# Patient Record
Sex: FEMALE | Race: BLACK OR AFRICAN AMERICAN | NOT HISPANIC OR LATINO | Employment: STUDENT | URBAN - METROPOLITAN AREA
[De-identification: names, ages, dates, MRNs, and addresses within clinical notes are randomized per-mention and may not be internally consistent; named-entity substitution may affect disease eponyms.]

---

## 2019-03-08 ENCOUNTER — LAB VISIT (OUTPATIENT)
Dept: LAB | Facility: HOSPITAL | Age: 19
End: 2019-03-08
Attending: ADVANCED PRACTICE MIDWIFE
Payer: MEDICAID

## 2019-03-08 ENCOUNTER — INITIAL PRENATAL (OUTPATIENT)
Dept: OBSTETRICS AND GYNECOLOGY | Facility: CLINIC | Age: 19
End: 2019-03-08
Payer: MEDICAID

## 2019-03-08 ENCOUNTER — PROCEDURE VISIT (OUTPATIENT)
Dept: OBSTETRICS AND GYNECOLOGY | Facility: CLINIC | Age: 19
End: 2019-03-08
Payer: MEDICAID

## 2019-03-08 VITALS — DIASTOLIC BLOOD PRESSURE: 60 MMHG | SYSTOLIC BLOOD PRESSURE: 100 MMHG | WEIGHT: 177.69 LBS

## 2019-03-08 DIAGNOSIS — Z34.01 ENCOUNTER FOR SUPERVISION OF NORMAL FIRST PREGNANCY IN FIRST TRIMESTER: ICD-10-CM

## 2019-03-08 DIAGNOSIS — Z36.89 ENCOUNTER FOR FETAL ANATOMIC SURVEY: ICD-10-CM

## 2019-03-08 DIAGNOSIS — O26.841 UTERINE SIZE DATE DISCREPANCY, FIRST TRIMESTER: ICD-10-CM

## 2019-03-08 DIAGNOSIS — Z34.01 ENCOUNTER FOR SUPERVISION OF NORMAL FIRST PREGNANCY IN FIRST TRIMESTER: Primary | ICD-10-CM

## 2019-03-08 LAB
BASOPHILS # BLD AUTO: 0.02 K/UL
BASOPHILS NFR BLD: 0.3 %
DIFFERENTIAL METHOD: ABNORMAL
EOSINOPHIL # BLD AUTO: 0 K/UL
EOSINOPHIL NFR BLD: 0.5 %
ERYTHROCYTE [DISTWIDTH] IN BLOOD BY AUTOMATED COUNT: 13.2 %
HCT VFR BLD AUTO: 36 %
HGB BLD-MCNC: 11.9 G/DL
HGB S BLD QL SOLY: POSITIVE
IMM GRANULOCYTES # BLD AUTO: 0.02 K/UL
IMM GRANULOCYTES NFR BLD AUTO: 0.3 %
LYMPHOCYTES # BLD AUTO: 1.4 K/UL
LYMPHOCYTES NFR BLD: 23.4 %
MCH RBC QN AUTO: 30.8 PG
MCHC RBC AUTO-ENTMCNC: 33.1 G/DL
MCV RBC AUTO: 93 FL
MONOCYTES # BLD AUTO: 0.5 K/UL
MONOCYTES NFR BLD: 7.9 %
NEUTROPHILS # BLD AUTO: 3.9 K/UL
NEUTROPHILS NFR BLD: 67.6 %
NRBC BLD-RTO: 0 /100 WBC
PLATELET # BLD AUTO: 158 K/UL
PMV BLD AUTO: 13.3 FL
RBC # BLD AUTO: 3.86 M/UL
WBC # BLD AUTO: 5.8 K/UL

## 2019-03-08 PROCEDURE — 87491 CHLMYD TRACH DNA AMP PROBE: CPT

## 2019-03-08 PROCEDURE — 86703 HIV-1/HIV-2 1 RESULT ANTBDY: CPT

## 2019-03-08 PROCEDURE — 99999 PR PBB SHADOW E&M-NEW PATIENT-LVL II: ICD-10-PCS | Mod: PBBFAC,,, | Performed by: ADVANCED PRACTICE MIDWIFE

## 2019-03-08 PROCEDURE — 76805 OB US >/= 14 WKS SNGL FETUS: CPT | Mod: 26,S$PBB,, | Performed by: OBSTETRICS & GYNECOLOGY

## 2019-03-08 PROCEDURE — 76805 OB US >/= 14 WKS SNGL FETUS: CPT | Mod: PBBFAC | Performed by: OBSTETRICS & GYNECOLOGY

## 2019-03-08 PROCEDURE — 36415 COLL VENOUS BLD VENIPUNCTURE: CPT

## 2019-03-08 PROCEDURE — 99999 PR PBB SHADOW E&M-NEW PATIENT-LVL II: CPT | Mod: PBBFAC,,, | Performed by: ADVANCED PRACTICE MIDWIFE

## 2019-03-08 PROCEDURE — 81025 URINE PREGNANCY TEST: CPT | Mod: PBBFAC | Performed by: ADVANCED PRACTICE MIDWIFE

## 2019-03-08 PROCEDURE — 86850 RBC ANTIBODY SCREEN: CPT

## 2019-03-08 PROCEDURE — 99204 PR OFFICE/OUTPT VISIT, NEW, LEVL IV, 45-59 MIN: ICD-10-PCS | Mod: TH,S$PBB,, | Performed by: ADVANCED PRACTICE MIDWIFE

## 2019-03-08 PROCEDURE — 86592 SYPHILIS TEST NON-TREP QUAL: CPT

## 2019-03-08 PROCEDURE — 99204 OFFICE O/P NEW MOD 45 MIN: CPT | Mod: TH,S$PBB,, | Performed by: ADVANCED PRACTICE MIDWIFE

## 2019-03-08 PROCEDURE — 76805 PR US, OB 14+WKS, TRANSABD, SINGLE GESTATION: ICD-10-PCS | Mod: 26,S$PBB,, | Performed by: OBSTETRICS & GYNECOLOGY

## 2019-03-08 PROCEDURE — 85025 COMPLETE CBC W/AUTO DIFF WBC: CPT

## 2019-03-08 PROCEDURE — 85660 RBC SICKLE CELL TEST: CPT

## 2019-03-08 PROCEDURE — 99202 OFFICE O/P NEW SF 15 MIN: CPT | Mod: PBBFAC,TH | Performed by: ADVANCED PRACTICE MIDWIFE

## 2019-03-08 PROCEDURE — 86762 RUBELLA ANTIBODY: CPT

## 2019-03-08 PROCEDURE — 87340 HEPATITIS B SURFACE AG IA: CPT

## 2019-03-08 NOTE — PROGRESS NOTES
CHIEF COMPLAINT:   Patient presents with     initial OB      HISTORY OF PRESENT ILLNESS  Sisi Culver 18 y.o.  presents for initial OB.   The patient has no complaints today.  No nausea or vomiting. No bleeding or pain.  Pregnancy was not  planned but is desired.   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 Denies domestic abuse.  Denies chemical/pesticide/radiation exposure.    OB history:  LMP: Patient's last menstrual period was 11/15/2018.  EDC: Estimated Date of Delivery: 19  EGA: 18w1d       Health Maintenance   Topic Date Due    Lipid Panel  1998    CHLAMYDIA SCREENING  10/15/2013    HPV Vaccines (1 - Female 3-dose series) 10/15/2013    TETANUS VACCINE  10/15/2016    Influenza Vaccine  2018       History reviewed. No pertinent past medical history.    History reviewed. No pertinent surgical history.    History reviewed. No pertinent family history.    Social History     Socioeconomic History    Marital status: Single     Spouse name: None    Number of children: None    Years of education: None    Highest education level: None   Social Needs    Financial resource strain: None    Food insecurity - worry: None    Food insecurity - inability: None    Transportation needs - medical: None    Transportation needs - non-medical: None   Occupational History    None   Tobacco Use    Smoking status: Never Smoker   Substance and Sexual Activity    Alcohol use: No    Drug use: No    Sexual activity: Yes     Partners: Male     Birth control/protection: None   Other Topics Concern    None   Social History Narrative    None       Current Outpatient Medications   Medication Sig Dispense Refill    prenatal vit103-iron fum-folic () 27 mg iron- 1 mg Tab Take 1 tablet by mouth once daily. 28 tablet 6     No current facility-administered medications for this visit.        Review of patient's allergies indicates:  No Known  Allergies      PHYSICAL EXAM   Vitals:    03/08/19 1029   BP: 100/60        PAIN SCALE: 0/10 None    PHYSICAL EXAM    ROS:  GENERAL: No fever, chills, fatigability or weight loss.  CV: Denies chest pain  PULM: Denies shortness of breath or wheezing.  ABDOMEN: Not eating as much. No weight loss. Denies diarrhea, abdominal pain, hematemesis or blood in stool, some nausea.  URINARY: No flank pain, dysuria or hematuria.  REPRODUCTIVE: No abnormal vaginal bleeding.       PE:   APPEARANCE: Well nourished, well developed, in no acute distress  CHEST: Clear to auscultation bilaterally  CV: Regular rate and rhythm  BREASTS: Symmetrical, no skin changes or visible lesions. No palpable masses, nipple discharge or adenopathy bilaterally.  ABDOMEN: Soft. No tenderness or masses. No hepatosplenomegaly. No hernias  PELVIC:   VULVA: No lesions. Normal female genitalia.  URETHRAL MEATUS: Normal size and location, no lesions, no prolapse.  URETHRA: No masses, tenderness, prolapse or scarring.  VAGINA: Moist and well rugated, no discharge, no significant cystocele or rectocele.  CERVIX: No lesions, normal diameter, no stenosis, no cervical motion tenderness.   UTERUS: 16 wk size, regular shape, mobile, non-tender, normal position, good support.  ADNEXA: No masses, tenderness or CDS nodularity.  ANUS PERINEUM: No lesions, no relaxation, no external hemorrhoids.     UPT +    A/P:     -      Patient was counseled today on A.C.S. Pap guidelines and recommendations for yearly pelvic exams, mammograms and monthly self breast exams; to see her PCP for other health maintenance and pregnancy.   -      Patient's medications and medical history reviewed with patient and implications in pregnancy.   -      Pregnancy course discussed and 'AtoZ' book given. Patient was counseled on proper weight gain based on the [place] [employer]'s recommendations based on her pre-pregnancy weight. Discussed foods to avoid in pregnancy (i.e. sushi, fish that  are high in mercury, deli meat, and unpasteurized cheeses). Discussed prenatal vitamin options (i.e. stool softener, DHA). Discussed potential medical problems in pregnancy.  -     Discussed risk of Toxoplasmosis transmission from pets and reviewed risk reduction techniques.  -     Pt was counseled on exercise in pregnancy and weight gain recommendations.  -     Pt was counseled on travel recommendations and on risks of Zika virus exposure.  Current [facility] Zika advisories and prevention techniques were reviewed with pt.  Pt denies any recent international travel and does not plan travel during pregnancy.  Pt reports that partner does not plan travel either.  -     Oriented to practice including CNM collaboration.   -     Follow-up 4wk OB, labs and u/s today.

## 2019-03-09 LAB
ABO + RH BLD: NORMAL
BLD GP AB SCN CELLS X3 SERPL QL: NORMAL
C TRACH DNA SPEC QL NAA+PROBE: DETECTED
N GONORRHOEA DNA SPEC QL NAA+PROBE: NOT DETECTED
RPR SER QL: NORMAL

## 2019-03-11 ENCOUNTER — TELEPHONE (OUTPATIENT)
Dept: OBSTETRICS AND GYNECOLOGY | Facility: HOSPITAL | Age: 19
End: 2019-03-11

## 2019-03-11 DIAGNOSIS — A74.9 CHLAMYDIA INFECTION AFFECTING PREGNANCY IN SECOND TRIMESTER, ANTEPARTUM: ICD-10-CM

## 2019-03-11 DIAGNOSIS — O98.812 CHLAMYDIA INFECTION AFFECTING PREGNANCY IN SECOND TRIMESTER, ANTEPARTUM: ICD-10-CM

## 2019-03-11 LAB
HBV SURFACE AG SERPL QL IA: NEGATIVE
HIV 1+2 AB+HIV1 P24 AG SERPL QL IA: NEGATIVE
RUBV IGG SER-ACNC: 16.2 IU/ML
RUBV IGG SER-IMP: REACTIVE

## 2019-03-11 RX ORDER — AZITHROMYCIN 500 MG/1
1000 TABLET, FILM COATED ORAL ONCE
Qty: 2 TABLET | Refills: 0 | Status: SHIPPED | OUTPATIENT
Start: 2019-03-11 | End: 2019-03-11

## 2019-03-11 NOTE — TELEPHONE ENCOUNTER
Please call pt and inform her that she is positive for chlamydia, meds sent to pharmacy, partner needs treatment, no sex until both are treated and test negative. Macey

## 2019-03-13 ENCOUNTER — TELEPHONE (OUTPATIENT)
Dept: OBSTETRICS AND GYNECOLOGY | Facility: CLINIC | Age: 19
End: 2019-03-13

## 2019-03-13 NOTE — TELEPHONE ENCOUNTER
----- Message from Brett Castellano sent at 3/13/2019 10:28 AM CDT -----  Contact: self [phone]  .Type:  Patient Returning Call    Who Called:Sisi Culver  Who Left Message for Patient:Macey  Does the patient know what this is regarding?:no  Would the patient rather a call back or a response via [email or web address]? Call back  Best Call Back Number:[phone]  Additional Information:

## 2019-03-13 NOTE — TELEPHONE ENCOUNTER
Spoke with pt. Notified of +chlam. Notified of rx. Advised to take the rx today and have partner/s treated, not just tested. Advised to avoid intercourse until PURNIMA. Pt verbalized understanding.

## 2019-03-18 ENCOUNTER — TELEPHONE (OUTPATIENT)
Dept: OBSTETRICS AND GYNECOLOGY | Facility: CLINIC | Age: 19
End: 2019-03-18

## 2019-03-18 NOTE — TELEPHONE ENCOUNTER
----- Message from Jessie Bell sent at 3/18/2019 11:46 AM CDT -----  ..Type:  Patient Returning Call    Who Called:pt   Who Left Message for Patient: unknown   Does the patient know what this is regarding?: unknown   Would the patient rather a call back or a response via [facility]? Call back   Best Call Back Number: [phone]  Additional Information: Pt is returning a call to nurse.

## 2019-04-05 ENCOUNTER — PROCEDURE VISIT (OUTPATIENT)
Dept: OBSTETRICS AND GYNECOLOGY | Facility: CLINIC | Age: 19
End: 2019-04-05
Payer: MEDICAID

## 2019-04-05 ENCOUNTER — ROUTINE PRENATAL (OUTPATIENT)
Dept: OBSTETRICS AND GYNECOLOGY | Facility: CLINIC | Age: 19
End: 2019-04-05
Payer: MEDICAID

## 2019-04-05 VITALS — SYSTOLIC BLOOD PRESSURE: 98 MMHG | WEIGHT: 178.81 LBS | DIASTOLIC BLOOD PRESSURE: 60 MMHG

## 2019-04-05 DIAGNOSIS — Z34.01 ENCOUNTER FOR SUPERVISION OF NORMAL FIRST PREGNANCY IN FIRST TRIMESTER: Primary | ICD-10-CM

## 2019-04-05 DIAGNOSIS — O26.841 UTERINE SIZE DATE DISCREPANCY, FIRST TRIMESTER: ICD-10-CM

## 2019-04-05 PROCEDURE — 76816 PR  US,PREGNANT UTERUS,F/U,TRANSABD APP: ICD-10-PCS | Mod: 26,S$PBB,, | Performed by: OBSTETRICS & GYNECOLOGY

## 2019-04-05 PROCEDURE — 76816 OB US FOLLOW-UP PER FETUS: CPT | Mod: PBBFAC | Performed by: OBSTETRICS & GYNECOLOGY

## 2019-04-05 PROCEDURE — 99213 OFFICE O/P EST LOW 20 MIN: CPT | Mod: TH,S$PBB,, | Performed by: ADVANCED PRACTICE MIDWIFE

## 2019-04-05 PROCEDURE — 99999 PR PBB SHADOW E&M-EST. PATIENT-LVL II: CPT | Mod: PBBFAC,,, | Performed by: ADVANCED PRACTICE MIDWIFE

## 2019-04-05 PROCEDURE — 99213 PR OFFICE/OUTPT VISIT, EST, LEVL III, 20-29 MIN: ICD-10-PCS | Mod: TH,S$PBB,, | Performed by: ADVANCED PRACTICE MIDWIFE

## 2019-04-05 PROCEDURE — 99999 PR PBB SHADOW E&M-EST. PATIENT-LVL II: ICD-10-PCS | Mod: PBBFAC,,, | Performed by: ADVANCED PRACTICE MIDWIFE

## 2019-04-05 PROCEDURE — 99212 OFFICE O/P EST SF 10 MIN: CPT | Mod: PBBFAC,TH | Performed by: ADVANCED PRACTICE MIDWIFE

## 2019-04-05 PROCEDURE — 76816 OB US FOLLOW-UP PER FETUS: CPT | Mod: 26,S$PBB,, | Performed by: OBSTETRICS & GYNECOLOGY

## 2019-04-05 NOTE — PROGRESS NOTES
US today anatomy complete, growth normal, PURNIMA next OV, labs after 28 wks since late to care, orders placed. Discussed round ligament pain and comfort measures. Good FM. Worrying about ANDRES and returning to school. Discussed staying active to help with labor onset prior to term vs post term. al

## 2019-05-14 ENCOUNTER — ROUTINE PRENATAL (OUTPATIENT)
Dept: OBSTETRICS AND GYNECOLOGY | Facility: CLINIC | Age: 19
End: 2019-05-14
Payer: MEDICAID

## 2019-05-14 VITALS — DIASTOLIC BLOOD PRESSURE: 72 MMHG | WEIGHT: 185.44 LBS | SYSTOLIC BLOOD PRESSURE: 116 MMHG

## 2019-05-14 DIAGNOSIS — Z34.01 ENCOUNTER FOR SUPERVISION OF NORMAL FIRST PREGNANCY IN FIRST TRIMESTER: Primary | ICD-10-CM

## 2019-05-14 PROCEDURE — 99212 OFFICE O/P EST SF 10 MIN: CPT | Mod: PBBFAC,TH | Performed by: ADVANCED PRACTICE MIDWIFE

## 2019-05-14 PROCEDURE — 99999 PR PBB SHADOW E&M-EST. PATIENT-LVL II: CPT | Mod: PBBFAC,,, | Performed by: ADVANCED PRACTICE MIDWIFE

## 2019-05-14 PROCEDURE — 87491 CHLMYD TRACH DNA AMP PROBE: CPT

## 2019-05-14 PROCEDURE — 99999 PR PBB SHADOW E&M-EST. PATIENT-LVL II: ICD-10-PCS | Mod: PBBFAC,,, | Performed by: ADVANCED PRACTICE MIDWIFE

## 2019-05-14 PROCEDURE — 99213 OFFICE O/P EST LOW 20 MIN: CPT | Mod: TH,S$PBB,, | Performed by: ADVANCED PRACTICE MIDWIFE

## 2019-05-14 PROCEDURE — 99213 PR OFFICE/OUTPT VISIT, EST, LEVL III, 20-29 MIN: ICD-10-PCS | Mod: TH,S$PBB,, | Performed by: ADVANCED PRACTICE MIDWIFE

## 2019-05-14 RX ORDER — ONDANSETRON 8 MG/1
TABLET, ORALLY DISINTEGRATING ORAL
Refills: 1 | COMMUNITY
Start: 2019-04-25 | End: 2020-05-21

## 2019-05-14 NOTE — PROGRESS NOTES
PURNIMA for chl sent today. Doing well, some tightening in her abdomen, discussed BH vs PTL s/s. Increase po water, no doing workouts at this time. tdap handout provided and reviewed. Labs next OV had a sundae today. Discussed LND plans-epid BF/FF, girl. tee brochure provided, reviewed how IUDs work, SE, DUB.     Patient viewed [employer] video, Nourish and was provided with [facility] handouts: A Good Latch and Tips for a More Comfortable Labor. Discussed nonpharmacological pain relief methods for labor, techniques and benefits of effective breastfeeding position and latch, and basic breastfeeding management. Encouraged patient to attend [facility]s Prenatal Breastfeeding Class and to download the [employer] mobile delphine if she has not already done so. Patient verbalizes understanding. al

## 2019-05-15 LAB
C TRACH DNA SPEC QL NAA+PROBE: NOT DETECTED
N GONORRHOEA DNA SPEC QL NAA+PROBE: NOT DETECTED

## 2019-05-30 ENCOUNTER — LAB VISIT (OUTPATIENT)
Dept: LAB | Facility: HOSPITAL | Age: 19
End: 2019-05-30
Payer: MEDICAID

## 2019-05-30 ENCOUNTER — ROUTINE PRENATAL (OUTPATIENT)
Dept: OBSTETRICS AND GYNECOLOGY | Facility: CLINIC | Age: 19
End: 2019-05-30
Payer: MEDICAID

## 2019-05-30 VITALS — WEIGHT: 186.31 LBS | DIASTOLIC BLOOD PRESSURE: 70 MMHG | SYSTOLIC BLOOD PRESSURE: 110 MMHG

## 2019-05-30 DIAGNOSIS — Z23 NEED FOR TDAP VACCINATION: ICD-10-CM

## 2019-05-30 DIAGNOSIS — Z34.01 ENCOUNTER FOR SUPERVISION OF NORMAL FIRST PREGNANCY IN FIRST TRIMESTER: Primary | ICD-10-CM

## 2019-05-30 DIAGNOSIS — Z34.01 ENCOUNTER FOR SUPERVISION OF NORMAL FIRST PREGNANCY IN FIRST TRIMESTER: ICD-10-CM

## 2019-05-30 LAB
BASOPHILS # BLD AUTO: 0.03 K/UL (ref 0–0.2)
BASOPHILS NFR BLD: 0.4 % (ref 0–1.9)
DIFFERENTIAL METHOD: ABNORMAL
EOSINOPHIL # BLD AUTO: 0.1 K/UL (ref 0–0.5)
EOSINOPHIL NFR BLD: 0.6 % (ref 0–8)
ERYTHROCYTE [DISTWIDTH] IN BLOOD BY AUTOMATED COUNT: 13.5 % (ref 11.5–14.5)
GLUCOSE SERPL-MCNC: 119 MG/DL (ref 70–140)
HCT VFR BLD AUTO: 36.4 % (ref 37–48.5)
HGB BLD-MCNC: 12.2 G/DL (ref 12–16)
IMM GRANULOCYTES # BLD AUTO: 0.07 K/UL (ref 0–0.04)
IMM GRANULOCYTES NFR BLD AUTO: 0.9 % (ref 0–0.5)
LYMPHOCYTES # BLD AUTO: 1.3 K/UL (ref 1–4.8)
LYMPHOCYTES NFR BLD: 16.6 % (ref 18–48)
MCH RBC QN AUTO: 31.3 PG (ref 27–31)
MCHC RBC AUTO-ENTMCNC: 33.5 G/DL (ref 32–36)
MCV RBC AUTO: 93 FL (ref 82–98)
MONOCYTES # BLD AUTO: 0.5 K/UL (ref 0.3–1)
MONOCYTES NFR BLD: 6.3 % (ref 4–15)
NEUTROPHILS # BLD AUTO: 6 K/UL (ref 1.8–7.7)
NEUTROPHILS NFR BLD: 75.2 % (ref 38–73)
NRBC BLD-RTO: 0 /100 WBC
PLATELET # BLD AUTO: 161 K/UL (ref 150–350)
PMV BLD AUTO: 12.4 FL (ref 9.2–12.9)
RBC # BLD AUTO: 3.9 M/UL (ref 4–5.4)
WBC # BLD AUTO: 7.99 K/UL (ref 3.9–12.7)

## 2019-05-30 PROCEDURE — 99213 PR OFFICE/OUTPT VISIT, EST, LEVL III, 20-29 MIN: ICD-10-PCS | Mod: TH,S$PBB,, | Performed by: ADVANCED PRACTICE MIDWIFE

## 2019-05-30 PROCEDURE — 86703 HIV-1/HIV-2 1 RESULT ANTBDY: CPT

## 2019-05-30 PROCEDURE — 82950 GLUCOSE TEST: CPT

## 2019-05-30 PROCEDURE — 99999 PR PBB SHADOW E&M-EST. PATIENT-LVL II: ICD-10-PCS | Mod: PBBFAC,,, | Performed by: ADVANCED PRACTICE MIDWIFE

## 2019-05-30 PROCEDURE — 85025 COMPLETE CBC W/AUTO DIFF WBC: CPT

## 2019-05-30 PROCEDURE — 99212 OFFICE O/P EST SF 10 MIN: CPT | Mod: PBBFAC,TH | Performed by: ADVANCED PRACTICE MIDWIFE

## 2019-05-30 PROCEDURE — 36415 COLL VENOUS BLD VENIPUNCTURE: CPT

## 2019-05-30 PROCEDURE — 86592 SYPHILIS TEST NON-TREP QUAL: CPT

## 2019-05-30 PROCEDURE — 99999 PR PBB SHADOW E&M-EST. PATIENT-LVL II: CPT | Mod: PBBFAC,,, | Performed by: ADVANCED PRACTICE MIDWIFE

## 2019-05-30 PROCEDURE — 90471 IMMUNIZATION ADMIN: CPT | Mod: PBBFAC

## 2019-05-30 PROCEDURE — 99213 OFFICE O/P EST LOW 20 MIN: CPT | Mod: TH,S$PBB,, | Performed by: ADVANCED PRACTICE MIDWIFE

## 2019-05-31 LAB
HIV 1+2 AB+HIV1 P24 AG SERPL QL IA: NEGATIVE
RPR SER QL: NORMAL

## 2019-06-05 NOTE — PROGRESS NOTES
Doing labs today, good FM, no PTL s/s. tdap today. Planning to breastfeed. Exp growth scan 34 wks.     Patient viewed [employer] video, Nourish and was provided with [facility] handouts: A Good Latch and Tips for a More Comfortable Labor. Discussed nonpharmacological pain relief methods for labor, techniques and benefits of effective breastfeeding position and latch, and basic breastfeeding management. Encouraged patient to attend [facility]s Prenatal Breastfeeding Class and to download the [email or web address] mobile delphine if she has not already done so. Patient verbalizes understanding.  al

## 2019-06-12 ENCOUNTER — ROUTINE PRENATAL (OUTPATIENT)
Dept: OBSTETRICS AND GYNECOLOGY | Facility: CLINIC | Age: 19
End: 2019-06-12
Payer: MEDICAID

## 2019-06-12 VITALS — DIASTOLIC BLOOD PRESSURE: 68 MMHG | WEIGHT: 187.19 LBS | SYSTOLIC BLOOD PRESSURE: 114 MMHG

## 2019-06-12 DIAGNOSIS — Z3A.31 31 WEEKS GESTATION OF PREGNANCY: ICD-10-CM

## 2019-06-12 DIAGNOSIS — Z34.03 ENCOUNTER FOR SUPERVISION OF NORMAL FIRST PREGNANCY IN THIRD TRIMESTER: Primary | ICD-10-CM

## 2019-06-12 DIAGNOSIS — O26.843 UTERINE SIZE-DATE DISCREPANCY IN THIRD TRIMESTER: ICD-10-CM

## 2019-06-12 PROCEDURE — 99212 OFFICE O/P EST SF 10 MIN: CPT | Mod: TH,S$PBB,, | Performed by: MIDWIFE

## 2019-06-12 PROCEDURE — 99212 PR OFFICE/OUTPT VISIT, EST, LEVL II, 10-19 MIN: ICD-10-PCS | Mod: TH,S$PBB,, | Performed by: MIDWIFE

## 2019-06-12 PROCEDURE — 99212 OFFICE O/P EST SF 10 MIN: CPT | Mod: PBBFAC,TH | Performed by: MIDWIFE

## 2019-06-12 PROCEDURE — 99999 PR PBB SHADOW E&M-EST. PATIENT-LVL II: ICD-10-PCS | Mod: PBBFAC,,, | Performed by: MIDWIFE

## 2019-06-12 PROCEDURE — 99999 PR PBB SHADOW E&M-EST. PATIENT-LVL II: CPT | Mod: PBBFAC,,, | Performed by: MIDWIFE

## 2019-06-12 NOTE — PROGRESS NOTES
18 y.o. female  at 31w6d   Reports + FM, denies VB, LOF or CTX  Doing well without concerns   TW lbs   Reviewed 28wk lab results (O POS)  US at [facility] for growth  Reviewed warning signs, normal FKCs,  labor precautions and how/when to call.  RTC x 2 wks, call or present sooner prn.

## 2019-06-26 ENCOUNTER — ROUTINE PRENATAL (OUTPATIENT)
Dept: OBSTETRICS AND GYNECOLOGY | Facility: CLINIC | Age: 19
End: 2019-06-26
Payer: MEDICAID

## 2019-06-26 ENCOUNTER — PROCEDURE VISIT (OUTPATIENT)
Dept: OBSTETRICS AND GYNECOLOGY | Facility: CLINIC | Age: 19
End: 2019-06-26
Payer: MEDICAID

## 2019-06-26 VITALS — WEIGHT: 190.5 LBS | DIASTOLIC BLOOD PRESSURE: 68 MMHG | SYSTOLIC BLOOD PRESSURE: 104 MMHG

## 2019-06-26 DIAGNOSIS — Z34.03 ENCOUNTER FOR SUPERVISION OF NORMAL FIRST PREGNANCY IN THIRD TRIMESTER: Primary | ICD-10-CM

## 2019-06-26 DIAGNOSIS — O26.843 UTERINE SIZE-DATE DISCREPANCY IN THIRD TRIMESTER: ICD-10-CM

## 2019-06-26 PROCEDURE — 76816 PR  US,PREGNANT UTERUS,F/U,TRANSABD APP: ICD-10-PCS | Mod: 26,S$PBB,, | Performed by: OBSTETRICS & GYNECOLOGY

## 2019-06-26 PROCEDURE — 99999 PR PBB SHADOW E&M-EST. PATIENT-LVL II: ICD-10-PCS | Mod: PBBFAC,,, | Performed by: ADVANCED PRACTICE MIDWIFE

## 2019-06-26 PROCEDURE — 99212 OFFICE O/P EST SF 10 MIN: CPT | Mod: PBBFAC,TH,25 | Performed by: ADVANCED PRACTICE MIDWIFE

## 2019-06-26 PROCEDURE — 76816 OB US FOLLOW-UP PER FETUS: CPT | Mod: PBBFAC | Performed by: OBSTETRICS & GYNECOLOGY

## 2019-06-26 PROCEDURE — 76816 OB US FOLLOW-UP PER FETUS: CPT | Mod: 26,S$PBB,, | Performed by: OBSTETRICS & GYNECOLOGY

## 2019-06-26 PROCEDURE — 99213 OFFICE O/P EST LOW 20 MIN: CPT | Mod: TH,S$PBB,, | Performed by: ADVANCED PRACTICE MIDWIFE

## 2019-06-26 PROCEDURE — 99999 PR PBB SHADOW E&M-EST. PATIENT-LVL II: CPT | Mod: PBBFAC,,, | Performed by: ADVANCED PRACTICE MIDWIFE

## 2019-06-26 PROCEDURE — 99213 PR OFFICE/OUTPT VISIT, EST, LEVL III, 20-29 MIN: ICD-10-PCS | Mod: TH,S$PBB,, | Performed by: ADVANCED PRACTICE MIDWIFE

## 2019-06-27 NOTE — PROGRESS NOTES
Reports good FM.  US reviewed, VTX, EFW 27%, MVP 5.0, BPP 8 of 8   States is going to BF.  Encouraged to download coffective delphine.  Benefits of skin to skin an BF reviewed.  Normal FKC's, S/S PTL and when to go to the hospital reviewed.  RTC 2 weeks with GBS

## 2019-07-10 ENCOUNTER — ROUTINE PRENATAL (OUTPATIENT)
Dept: OBSTETRICS AND GYNECOLOGY | Facility: CLINIC | Age: 19
End: 2019-07-10
Payer: MEDICAID

## 2019-07-10 VITALS — SYSTOLIC BLOOD PRESSURE: 118 MMHG | DIASTOLIC BLOOD PRESSURE: 74 MMHG | WEIGHT: 194 LBS

## 2019-07-10 DIAGNOSIS — Z34.03 ENCOUNTER FOR SUPERVISION OF NORMAL FIRST PREGNANCY IN THIRD TRIMESTER: Primary | ICD-10-CM

## 2019-07-10 PROCEDURE — 99213 OFFICE O/P EST LOW 20 MIN: CPT | Mod: TH,S$PBB,, | Performed by: ADVANCED PRACTICE MIDWIFE

## 2019-07-10 PROCEDURE — 87081 CULTURE SCREEN ONLY: CPT

## 2019-07-10 PROCEDURE — 99999 PR PBB SHADOW E&M-EST. PATIENT-LVL II: CPT | Mod: PBBFAC,,, | Performed by: ADVANCED PRACTICE MIDWIFE

## 2019-07-10 PROCEDURE — 99213 PR OFFICE/OUTPT VISIT, EST, LEVL III, 20-29 MIN: ICD-10-PCS | Mod: TH,S$PBB,, | Performed by: ADVANCED PRACTICE MIDWIFE

## 2019-07-10 PROCEDURE — 99999 PR PBB SHADOW E&M-EST. PATIENT-LVL II: ICD-10-PCS | Mod: PBBFAC,,, | Performed by: ADVANCED PRACTICE MIDWIFE

## 2019-07-10 PROCEDURE — 99212 OFFICE O/P EST SF 10 MIN: CPT | Mod: PBBFAC,TH | Performed by: ADVANCED PRACTICE MIDWIFE

## 2019-07-10 NOTE — PROGRESS NOTES
Doing well, no complaints  [employer]ivani [employer] brochures given to pt, will order at [facility]   GBS collected and handout given  VE per pt request  Labor precautions and fetal movement reviewed, when to go to hospital   Stressed hydration and exercise  Discussed breastfeeding and ordering breastpump  The skin of the suprapubic region was evaluated and appears clean, dry and intact.  Counseled the patient to shower daily and to wash this area with an antibacterial soap such as Dial daily.  Advised her to not shave the hair from this area from now until after delivery.  I also counseled the patient to place antibacterial hand soap in all her bathrooms and kitchen at home to help facilitate proper hand hygiene practices before and after delivery.

## 2019-07-13 LAB — BACTERIA SPEC AEROBE CULT: NORMAL

## 2019-07-17 ENCOUNTER — ROUTINE PRENATAL (OUTPATIENT)
Dept: OBSTETRICS AND GYNECOLOGY | Facility: CLINIC | Age: 19
End: 2019-07-17
Payer: MEDICAID

## 2019-07-17 VITALS — WEIGHT: 198.63 LBS | SYSTOLIC BLOOD PRESSURE: 118 MMHG | DIASTOLIC BLOOD PRESSURE: 68 MMHG

## 2019-07-17 DIAGNOSIS — Z34.03 ENCOUNTER FOR SUPERVISION OF NORMAL FIRST PREGNANCY IN THIRD TRIMESTER: Primary | ICD-10-CM

## 2019-07-17 PROCEDURE — 99213 OFFICE O/P EST LOW 20 MIN: CPT | Mod: TH,S$PBB,, | Performed by: ADVANCED PRACTICE MIDWIFE

## 2019-07-17 PROCEDURE — 99213 PR OFFICE/OUTPT VISIT, EST, LEVL III, 20-29 MIN: ICD-10-PCS | Mod: TH,S$PBB,, | Performed by: ADVANCED PRACTICE MIDWIFE

## 2019-07-17 PROCEDURE — 99999 PR PBB SHADOW E&M-EST. PATIENT-LVL II: ICD-10-PCS | Mod: PBBFAC,,, | Performed by: ADVANCED PRACTICE MIDWIFE

## 2019-07-17 PROCEDURE — 99212 OFFICE O/P EST SF 10 MIN: CPT | Mod: PBBFAC,TH | Performed by: ADVANCED PRACTICE MIDWIFE

## 2019-07-17 PROCEDURE — 99999 PR PBB SHADOW E&M-EST. PATIENT-LVL II: CPT | Mod: PBBFAC,,, | Performed by: ADVANCED PRACTICE MIDWIFE

## 2019-07-21 NOTE — PROGRESS NOTES
Reports good FM.  Advised GBS neg.  Mirena ordered.  Normal FKC's, S/S labor and when to go to hospital.

## 2019-07-24 ENCOUNTER — ROUTINE PRENATAL (OUTPATIENT)
Dept: OBSTETRICS AND GYNECOLOGY | Facility: CLINIC | Age: 19
End: 2019-07-24
Payer: MEDICAID

## 2019-07-24 VITALS — WEIGHT: 202.38 LBS | DIASTOLIC BLOOD PRESSURE: 84 MMHG | SYSTOLIC BLOOD PRESSURE: 122 MMHG

## 2019-07-24 DIAGNOSIS — Z36.89 ENCOUNTER FOR ULTRASOUND TO CHECK FETAL GROWTH: ICD-10-CM

## 2019-07-24 DIAGNOSIS — Z3A.37 37 WEEKS GESTATION OF PREGNANCY: ICD-10-CM

## 2019-07-24 DIAGNOSIS — Z34.03 ENCOUNTER FOR SUPERVISION OF NORMAL FIRST PREGNANCY IN THIRD TRIMESTER: Primary | ICD-10-CM

## 2019-07-24 DIAGNOSIS — O26.843 UTERINE SIZE DATE DISCREPANCY PREGNANCY, THIRD TRIMESTER: ICD-10-CM

## 2019-07-24 PROCEDURE — 99212 OFFICE O/P EST SF 10 MIN: CPT | Mod: TH,S$PBB,, | Performed by: MIDWIFE

## 2019-07-24 PROCEDURE — 99999 PR PBB SHADOW E&M-EST. PATIENT-LVL II: ICD-10-PCS | Mod: PBBFAC,,, | Performed by: MIDWIFE

## 2019-07-24 PROCEDURE — 99999 PR PBB SHADOW E&M-EST. PATIENT-LVL II: CPT | Mod: PBBFAC,,, | Performed by: MIDWIFE

## 2019-07-24 PROCEDURE — 99212 OFFICE O/P EST SF 10 MIN: CPT | Mod: PBBFAC,TH | Performed by: MIDWIFE

## 2019-07-24 PROCEDURE — 99212 PR OFFICE/OUTPT VISIT, EST, LEVL II, 10-19 MIN: ICD-10-PCS | Mod: TH,S$PBB,, | Performed by: MIDWIFE

## 2019-07-24 NOTE — PROGRESS NOTES
18 y.o. female  at 37w6d   Reports + FM, denies VB, LOF or regular CTX  Doing well without concerns   TW lbs   U/S for growth at next appt  Delivery consents already signed  Reviewed warning signs, normal FKCs, labor precautions and how/when to call.  RTC x 1 wks, call or present sooner prn.     ELEANOR Sanchez CNM

## 2019-07-31 ENCOUNTER — PROCEDURE VISIT (OUTPATIENT)
Dept: OBSTETRICS AND GYNECOLOGY | Facility: CLINIC | Age: 19
End: 2019-07-31
Payer: MEDICAID

## 2019-07-31 ENCOUNTER — ROUTINE PRENATAL (OUTPATIENT)
Dept: OBSTETRICS AND GYNECOLOGY | Facility: CLINIC | Age: 19
End: 2019-07-31
Payer: MEDICAID

## 2019-07-31 VITALS — DIASTOLIC BLOOD PRESSURE: 70 MMHG | SYSTOLIC BLOOD PRESSURE: 102 MMHG | WEIGHT: 200 LBS

## 2019-07-31 DIAGNOSIS — Z36.89 ENCOUNTER FOR ULTRASOUND TO CHECK FETAL GROWTH: ICD-10-CM

## 2019-07-31 DIAGNOSIS — Z34.03 ENCOUNTER FOR SUPERVISION OF NORMAL FIRST PREGNANCY IN THIRD TRIMESTER: Primary | ICD-10-CM

## 2019-07-31 PROCEDURE — 99999 PR PBB SHADOW E&M-EST. PATIENT-LVL II: CPT | Mod: PBBFAC,,, | Performed by: ADVANCED PRACTICE MIDWIFE

## 2019-07-31 PROCEDURE — 76815 OB US LIMITED FETUS(S): CPT | Mod: PBBFAC | Performed by: OBSTETRICS & GYNECOLOGY

## 2019-07-31 PROCEDURE — 99213 OFFICE O/P EST LOW 20 MIN: CPT | Mod: TH,S$PBB,, | Performed by: ADVANCED PRACTICE MIDWIFE

## 2019-07-31 PROCEDURE — 99999 PR PBB SHADOW E&M-EST. PATIENT-LVL II: ICD-10-PCS | Mod: PBBFAC,,, | Performed by: ADVANCED PRACTICE MIDWIFE

## 2019-07-31 PROCEDURE — 99212 OFFICE O/P EST SF 10 MIN: CPT | Mod: PBBFAC,TH,25 | Performed by: ADVANCED PRACTICE MIDWIFE

## 2019-07-31 PROCEDURE — 76819 FETAL BIOPHYS PROFIL W/O NST: CPT | Mod: PBBFAC | Performed by: OBSTETRICS & GYNECOLOGY

## 2019-07-31 PROCEDURE — 76819 FETAL BIOPHYS PROFIL W/O NST: CPT | Mod: 26,S$PBB,, | Performed by: OBSTETRICS & GYNECOLOGY

## 2019-07-31 PROCEDURE — 76815 PR  US,PREGNANT UTERUS,LIMITED, 1/> FETUSES: ICD-10-PCS | Mod: 26,S$PBB,, | Performed by: OBSTETRICS & GYNECOLOGY

## 2019-07-31 PROCEDURE — 99213 PR OFFICE/OUTPT VISIT, EST, LEVL III, 20-29 MIN: ICD-10-PCS | Mod: TH,S$PBB,, | Performed by: ADVANCED PRACTICE MIDWIFE

## 2019-07-31 PROCEDURE — 76815 OB US LIMITED FETUS(S): CPT | Mod: 26,S$PBB,, | Performed by: OBSTETRICS & GYNECOLOGY

## 2019-07-31 PROCEDURE — 76819 PR US, OB, FETAL BIOPHYSICAL, W/O NST: ICD-10-PCS | Mod: 26,S$PBB,, | Performed by: OBSTETRICS & GYNECOLOGY

## 2019-07-31 NOTE — PROGRESS NOTES
Doing well, reports some vaginal pressure  Labor precautions and fetal movement reviewed, when to go to hospital   Stressed hydration   VE per pt request  Discussed IOL at 41 weeks if undelivered, pt verbalizes understanding  GBS negative, pt aware  PNV refilled  Ultrasound today with cephalic presentation, anterior placenta, 3VC, MVP 5.3cm, YUMIKO 11.6cm, EFW 37%, 7lb 2oz, BPP 8/8, reviewed with pt and her mother

## 2019-08-07 ENCOUNTER — ROUTINE PRENATAL (OUTPATIENT)
Dept: OBSTETRICS AND GYNECOLOGY | Facility: CLINIC | Age: 19
End: 2019-08-07
Payer: MEDICAID

## 2019-08-07 VITALS — SYSTOLIC BLOOD PRESSURE: 116 MMHG | WEIGHT: 201.06 LBS | DIASTOLIC BLOOD PRESSURE: 78 MMHG

## 2019-08-07 DIAGNOSIS — O48.0 POST-TERM PREGNANCY, 40-42 WEEKS OF GESTATION: ICD-10-CM

## 2019-08-07 DIAGNOSIS — Z34.03 ENCOUNTER FOR SUPERVISION OF NORMAL FIRST PREGNANCY IN THIRD TRIMESTER: Primary | ICD-10-CM

## 2019-08-07 PROCEDURE — 99212 OFFICE O/P EST SF 10 MIN: CPT | Mod: PBBFAC,TH | Performed by: ADVANCED PRACTICE MIDWIFE

## 2019-08-07 PROCEDURE — 99213 PR OFFICE/OUTPT VISIT, EST, LEVL III, 20-29 MIN: ICD-10-PCS | Mod: TH,S$PBB,, | Performed by: ADVANCED PRACTICE MIDWIFE

## 2019-08-07 PROCEDURE — 99999 PR PBB SHADOW E&M-EST. PATIENT-LVL II: CPT | Mod: PBBFAC,,, | Performed by: ADVANCED PRACTICE MIDWIFE

## 2019-08-07 PROCEDURE — 99213 OFFICE O/P EST LOW 20 MIN: CPT | Mod: TH,S$PBB,, | Performed by: ADVANCED PRACTICE MIDWIFE

## 2019-08-07 PROCEDURE — 99999 PR PBB SHADOW E&M-EST. PATIENT-LVL II: ICD-10-PCS | Mod: PBBFAC,,, | Performed by: ADVANCED PRACTICE MIDWIFE

## 2019-08-07 RX ORDER — MISOPROSTOL 100 MCG
25 TABLET ORAL EVERY 4 HOURS
Status: CANCELLED | OUTPATIENT
Start: 2019-08-07 | End: 2019-08-08

## 2019-08-07 RX ORDER — MISOPROSTOL 100 UG/1
600 TABLET ORAL
Status: CANCELLED | OUTPATIENT
Start: 2019-08-07

## 2019-08-07 RX ORDER — SODIUM CHLORIDE 9 MG/ML
INJECTION, SOLUTION INTRAVENOUS
Status: CANCELLED | OUTPATIENT
Start: 2019-08-07

## 2019-08-07 RX ORDER — ONDANSETRON 4 MG/1
8 TABLET, ORALLY DISINTEGRATING ORAL EVERY 8 HOURS PRN
Status: CANCELLED | OUTPATIENT
Start: 2019-08-07

## 2019-08-07 RX ORDER — TERBUTALINE SULFATE 1 MG/ML
0.25 INJECTION SUBCUTANEOUS
Status: CANCELLED | OUTPATIENT
Start: 2019-08-07

## 2019-08-07 RX ORDER — SODIUM CHLORIDE, SODIUM LACTATE, POTASSIUM CHLORIDE, CALCIUM CHLORIDE 600; 310; 30; 20 MG/100ML; MG/100ML; MG/100ML; MG/100ML
INJECTION, SOLUTION INTRAVENOUS CONTINUOUS
Status: CANCELLED | OUTPATIENT
Start: 2019-08-07

## 2019-08-07 NOTE — PROGRESS NOTES
Doing well today with no complaints. READY for baby.   Request membrane sweep. Risks/benefits discussed. Consent obtained.   Cervix soft.   Reviewed FKC/ROM/Labor precautions.   Discussed risks/benefits of IOL. Increased risk for c/s. Patient aware and wishes to proceed next week with IOL. Set for 08/12 @ 0001. Orders placed.

## 2019-08-08 ENCOUNTER — ANESTHESIA (OUTPATIENT)
Dept: OBSTETRICS AND GYNECOLOGY | Facility: HOSPITAL | Age: 19
End: 2019-08-08

## 2019-08-08 ENCOUNTER — ANESTHESIA EVENT (OUTPATIENT)
Dept: OBSTETRICS AND GYNECOLOGY | Facility: HOSPITAL | Age: 19
End: 2019-08-08

## 2019-08-08 ENCOUNTER — HOSPITAL ENCOUNTER (INPATIENT)
Facility: HOSPITAL | Age: 19
LOS: 2 days | Discharge: HOME | End: 2019-08-10
Attending: OBSTETRICS & GYNECOLOGY | Admitting: OBSTETRICS & GYNECOLOGY
Payer: MEDICAID

## 2019-08-08 DIAGNOSIS — Z34.03 ENCOUNTER FOR SUPERVISION OF NORMAL FIRST PREGNANCY IN THIRD TRIMESTER: ICD-10-CM

## 2019-08-08 DIAGNOSIS — O48.0 POST-TERM PREGNANCY, 40-42 WEEKS OF GESTATION: ICD-10-CM

## 2019-08-08 DIAGNOSIS — O47.9 THREATENED LABOR AT TERM: ICD-10-CM

## 2019-08-08 DIAGNOSIS — O42.90 AMNIOTIC FLUID LEAKING: ICD-10-CM

## 2019-08-08 LAB
ABO + RH BLD: NORMAL
BASOPHILS # BLD AUTO: 0.01 K/UL (ref 0–0.2)
BASOPHILS NFR BLD: 0.1 % (ref 0–1.9)
BLD GP AB SCN CELLS X3 SERPL QL: NORMAL
DIFFERENTIAL METHOD: ABNORMAL
EOSINOPHIL # BLD AUTO: 0 K/UL (ref 0–0.5)
EOSINOPHIL NFR BLD: 0.1 % (ref 0–8)
ERYTHROCYTE [DISTWIDTH] IN BLOOD BY AUTOMATED COUNT: 12.3 % (ref 11.5–14.5)
HCT VFR BLD AUTO: 35.8 % (ref 37–48.5)
HGB BLD-MCNC: 12.4 G/DL (ref 12–16)
LYMPHOCYTES # BLD AUTO: 1.4 K/UL (ref 1–4.8)
LYMPHOCYTES NFR BLD: 12.5 % (ref 18–48)
MCH RBC QN AUTO: 31 PG (ref 27–31)
MCHC RBC AUTO-ENTMCNC: 34.6 G/DL (ref 32–36)
MCV RBC AUTO: 90 FL (ref 82–98)
MONOCYTES # BLD AUTO: 0.7 K/UL (ref 0.3–1)
MONOCYTES NFR BLD: 6.3 % (ref 4–15)
NEUTROPHILS # BLD AUTO: 8.8 K/UL (ref 1.8–7.7)
NEUTROPHILS NFR BLD: 81 % (ref 38–73)
PLATELET # BLD AUTO: 164 K/UL (ref 150–350)
PMV BLD AUTO: 12.3 FL (ref 9.2–12.9)
RBC # BLD AUTO: 4 M/UL (ref 4–5.4)
WBC # BLD AUTO: 10.89 K/UL (ref 3.9–12.7)

## 2019-08-08 PROCEDURE — 72100002 HC LABOR CARE, 1ST 8 HOURS

## 2019-08-08 PROCEDURE — 85025 COMPLETE CBC W/AUTO DIFF WBC: CPT

## 2019-08-08 PROCEDURE — 11000001 HC ACUTE MED/SURG PRIVATE ROOM

## 2019-08-08 PROCEDURE — 59025 FETAL NON-STRESS TEST: CPT

## 2019-08-08 PROCEDURE — 72200004 HC VAGINAL DELIVERY LEVEL I

## 2019-08-08 PROCEDURE — 99211 OFF/OP EST MAY X REQ PHY/QHP: CPT | Mod: 25,TH

## 2019-08-08 PROCEDURE — 59409 OBSTETRICAL CARE: CPT | Mod: GB,,, | Performed by: MIDWIFE

## 2019-08-08 PROCEDURE — 25000003 PHARM REV CODE 250: Performed by: MIDWIFE

## 2019-08-08 PROCEDURE — 59409 PR OBSTETRICAL CARE,VAG DELIV ONLY: ICD-10-PCS | Mod: GB,,, | Performed by: MIDWIFE

## 2019-08-08 PROCEDURE — 36415 COLL VENOUS BLD VENIPUNCTURE: CPT

## 2019-08-08 PROCEDURE — 86850 RBC ANTIBODY SCREEN: CPT

## 2019-08-08 RX ORDER — OXYTOCIN/RINGER'S LACTATE 20/1000 ML
41.7 PLASTIC BAG, INJECTION (ML) INTRAVENOUS CONTINUOUS
Status: DISCONTINUED | OUTPATIENT
Start: 2019-08-08 | End: 2019-08-08

## 2019-08-08 RX ORDER — ONDANSETRON 8 MG/1
8 TABLET, ORALLY DISINTEGRATING ORAL EVERY 8 HOURS PRN
Status: DISCONTINUED | OUTPATIENT
Start: 2019-08-08 | End: 2019-08-10 | Stop reason: HOSPADM

## 2019-08-08 RX ORDER — MISOPROSTOL 200 UG/1
600 TABLET ORAL
Status: DISCONTINUED | OUTPATIENT
Start: 2019-08-08 | End: 2019-08-10 | Stop reason: HOSPADM

## 2019-08-08 RX ORDER — ONDANSETRON 8 MG/1
8 TABLET, ORALLY DISINTEGRATING ORAL EVERY 8 HOURS PRN
Status: DISCONTINUED | OUTPATIENT
Start: 2019-08-08 | End: 2019-08-08

## 2019-08-08 RX ORDER — OXYTOCIN/RINGER'S LACTATE 20/1000 ML
41.65 PLASTIC BAG, INJECTION (ML) INTRAVENOUS CONTINUOUS
Status: DISPENSED | OUTPATIENT
Start: 2019-08-08 | End: 2019-08-09

## 2019-08-08 RX ORDER — DIPHENHYDRAMINE HYDROCHLORIDE 50 MG/ML
25 INJECTION INTRAMUSCULAR; INTRAVENOUS EVERY 4 HOURS PRN
Status: DISCONTINUED | OUTPATIENT
Start: 2019-08-08 | End: 2019-08-10 | Stop reason: HOSPADM

## 2019-08-08 RX ORDER — SODIUM CHLORIDE, SODIUM LACTATE, POTASSIUM CHLORIDE, CALCIUM CHLORIDE 600; 310; 30; 20 MG/100ML; MG/100ML; MG/100ML; MG/100ML
INJECTION, SOLUTION INTRAVENOUS CONTINUOUS
Status: DISCONTINUED | OUTPATIENT
Start: 2019-08-08 | End: 2019-08-08

## 2019-08-08 RX ORDER — IBUPROFEN 600 MG/1
600 TABLET ORAL EVERY 6 HOURS
Status: DISCONTINUED | OUTPATIENT
Start: 2019-08-08 | End: 2019-08-10 | Stop reason: HOSPADM

## 2019-08-08 RX ORDER — OXYTOCIN/RINGER'S LACTATE 20/1000 ML
2 PLASTIC BAG, INJECTION (ML) INTRAVENOUS CONTINUOUS
Status: DISCONTINUED | OUTPATIENT
Start: 2019-08-08 | End: 2019-08-08

## 2019-08-08 RX ORDER — ACETAMINOPHEN 500 MG
500 TABLET ORAL EVERY 6 HOURS PRN
Status: DISCONTINUED | OUTPATIENT
Start: 2019-08-08 | End: 2019-08-08

## 2019-08-08 RX ORDER — DIPHENHYDRAMINE HCL 25 MG
25 CAPSULE ORAL EVERY 4 HOURS PRN
Status: DISCONTINUED | OUTPATIENT
Start: 2019-08-08 | End: 2019-08-10 | Stop reason: HOSPADM

## 2019-08-08 RX ORDER — TERBUTALINE SULFATE 1 MG/ML
0.25 INJECTION SUBCUTANEOUS
Status: DISCONTINUED | OUTPATIENT
Start: 2019-08-08 | End: 2019-08-08

## 2019-08-08 RX ORDER — ACETAMINOPHEN 325 MG/1
650 TABLET ORAL EVERY 6 HOURS PRN
Status: DISCONTINUED | OUTPATIENT
Start: 2019-08-08 | End: 2019-08-10 | Stop reason: HOSPADM

## 2019-08-08 RX ORDER — OXYTOCIN/RINGER'S LACTATE 20/1000 ML
333 PLASTIC BAG, INJECTION (ML) INTRAVENOUS CONTINUOUS
Status: DISCONTINUED | OUTPATIENT
Start: 2019-08-08 | End: 2019-08-08

## 2019-08-08 RX ORDER — HYDROCODONE BITARTRATE AND ACETAMINOPHEN 5; 325 MG/1; MG/1
1 TABLET ORAL EVERY 4 HOURS PRN
Status: DISCONTINUED | OUTPATIENT
Start: 2019-08-08 | End: 2019-08-10 | Stop reason: HOSPADM

## 2019-08-08 RX ORDER — SODIUM CHLORIDE 9 MG/ML
INJECTION, SOLUTION INTRAVENOUS
Status: DISCONTINUED | OUTPATIENT
Start: 2019-08-08 | End: 2019-08-08

## 2019-08-08 RX ORDER — DOCUSATE SODIUM 100 MG/1
200 CAPSULE, LIQUID FILLED ORAL 2 TIMES DAILY PRN
Status: DISCONTINUED | OUTPATIENT
Start: 2019-08-08 | End: 2019-08-10 | Stop reason: HOSPADM

## 2019-08-08 RX ORDER — ONDANSETRON 8 MG/1
8 TABLET, ORALLY DISINTEGRATING ORAL EVERY 8 HOURS PRN
Status: DISCONTINUED | OUTPATIENT
Start: 2019-08-08 | End: 2019-08-08 | Stop reason: SDUPTHER

## 2019-08-08 RX ORDER — MISOPROSTOL 100 MCG
25 TABLET ORAL EVERY 4 HOURS
Status: DISCONTINUED | OUTPATIENT
Start: 2019-08-08 | End: 2019-08-08

## 2019-08-08 RX ADMIN — IBUPROFEN 600 MG: 600 TABLET ORAL at 06:08

## 2019-08-08 NOTE — ASSESSMENT & PLAN NOTE
, 40 wks labor  Admit  4/70%  SROM clear fluid  GBS neg  Continuous monitoring  Pitocin Augmentation  Epidural upon request

## 2019-08-08 NOTE — L&D DELIVERY NOTE
[facility] -   Vaginal Delivery   Operative Note    SUMMARY     Normal spontaneous vaginal delivery of live infant, was placed on mothers abdomen for skin to skin and bulb suctioning performed.  Infant delivered position VANDANA over intact perineum.  Nuchal cord: Yes, cord reduced following delivery.    Spontaneous delivery of placenta and IV pitocin given noting good uterine tone.  No lacerations noted.  Patient tolerated delivery well. Sponge needle and lap counted correctly x2.    Indications: Amniotic fluid leaking  Pregnancy complicated by:   Patient Active Problem List   Diagnosis    Chlamydia infection affecting pregnancy in second trimester, antepartum    Vaginal delivery    Single live birth     Admitting GA: 40w0d    Delivery Information for  Lidia Culver    Birth information:  YOB: 2021   Time of birth: 4:59 PM   Sex: female   Head Delivery Date/Time: 8/8/2019  4:59 PM   Delivery type: Vaginal, Spontaneous   Gestational Age: 40w0d    Delivery Providers    Delivering clinician:  Lisa Sanchez CNM   Provider Role    Dom Ritter CNM Midwife    Byron Basurto RN Registered Nurse    Heather Flores RN Registered Nurse    Linda Navarro, ST Surgical Tech            Measurements    Weight:    Length:           Apgars    Living status:  Living  Apgars:   1 min.:   5 min.:   10 min.:   15 min.:   20 min.:     Skin color:   1  1       Heart rate:   2  2       Reflex irritability:   2  2       Muscle tone:   2  2       Respiratory effort:   2  2       Total:   9  9       Apgars assigned by:  CHELITA FLORES RN         Operative Delivery    Forceps attempted?:  No  Vacuum extractor attempted?:  No         Shoulder Dystocia    Shoulder dystocia present?:  No           Presentation    Presentation:  Vertex  Position:  Middle Occiput Anterior           Interventions/Resuscitation    Method:  Bulb Suctioning, Tactile Stimulation       Cord    Vessels:  3 vessels  Complications:  None,  Nuchal  Nuchal Intervention:  reduced  Nuchal Cord Description:  loose nuchal cord  Number of Loops:  1  Delayed Cord Clamping?:  Yes  Cord Clamped Date/Time:  2019  5:01 PM  Cord Blood Disposition:  Lab  Gases Sent?:  No  Stem Cell Collection (by MD):  No       Placenta    Placenta delivery date/time:  2019 1705  Placenta removal:  Spontaneous  Placenta appearance:  Intact  Placenta disposition:  discarded           Labor Events:       labor: No     Labor Onset Date/Time:         Dilation Complete Date/Time:         Start Pushing Date/Time:       Rupture Date/Time:              Rupture type:           Fluid Amount:        Fluid Color:        Fluid Odor:        Membrane Status (PeriCalm): SRM (Spontaneous Rupture)      Rupture Date/Time (PeriCalm): 2019 14:50:00      Fluid Amount (PeriCalm): Small      Fluid Color (PeriCalm): Clear       steroids: None     Antibiotics given for GBS: No     Induction: none     Indications for induction:        Augmentation:       Indications for augmentation:       Labor complications: None     Additional complications:          Cervical ripening:                     Delivery:      Episiotomy: None     Indication for Episiotomy:       Perineal Lacerations: None Repaired:      Periurethral Laceration:   Repaired:     Labial Laceration:   Repaired:     Sulcus Laceration:   Repaired:     Vaginal Laceration:   Repaired:     Cervical Laceration:   Repaired:     Repair suture: None     Repair # of packets:       Last Value - EBL - Nursing (mL): 50     Sum - EBL - Nursing (mL): 50     Last Value - EBL - Anesthesia (mL):      Calculated QBL (mL):        Vaginal Sweep Performed: No     Surgicount Correct:         Other providers:       Anesthesia    Method:  None          Details (if applicable):  Trial of Labor      Categorization:      Priority:     Indications for :     Incision Type:       Additional  information:  Forceps:     Vacuum:    Breech:    Observed anomalies    Other (Comments):

## 2019-08-08 NOTE — SUBJECTIVE & OBJECTIVE
Obstetric HPI:  Patient reports  contractions, active fetal movement, No vaginal bleeding , Yes loss of fluid     This pregnancy has been complicated by chlamydia    OB History    Para Term  AB Living   1 0 0 0 0 0   SAB TAB Ectopic Multiple Live Births   0 0 0 0 0      # Outcome Date GA Lbr Danny/2nd Weight Sex Delivery Anes PTL Lv   1 Current              History reviewed. No pertinent past medical history.  History reviewed. No pertinent surgical history.    Facility-Administered Medications Prior to Admission   Medication    levonorgestrel 20 mcg/24 hours (5 yrs) 52 mg IUD 1 Intra Uterine Device     PTA Medications   Medication Sig    ondansetron (ZOFRAN-ODT) 8 MG TbDL DIS ONE T PO Q 8 H    prenatal vit103-iron fum-folic () 27 mg iron- 1 mg Tab Take 1 tablet by mouth once daily.       Review of patient's allergies indicates:  No Known Allergies     Family History     None        Tobacco Use    Smoking status: Never Smoker    Smokeless tobacco: Never Used   Substance and Sexual Activity    Alcohol use: No    Drug use: No    Sexual activity: Yes     Partners: Male     Birth control/protection: None     Review of Systems   Constitutional: Negative for activity change and appetite change.   HENT: Negative for tinnitus.    Eyes: Negative for visual disturbance.   Respiratory: Negative for cough.    Cardiovascular: Negative for chest pain.   Gastrointestinal: Positive for abdominal pain. Negative for nausea and vomiting.   Genitourinary: Positive for pelvic pain and vaginal discharge. Negative for vaginal bleeding.   Musculoskeletal: Negative for back pain.   Neurological: Negative for headaches.      Objective:     Vital Signs (Most Recent):  Temp: 98.3 °F (36.8 °C) (19 1134)  Pulse: 76 (19 1500)  Resp: 19 (19 1500)  BP: 121/85 (19 1334)  SpO2: 97 % (19 1500) Vital Signs (24h Range):  Temp:  [98.3 °F (36.8 °C)] 98.3 °F (36.8 °C)  Pulse:  [70-96] 76  Resp:   [18-20] 19  SpO2:  [96 %-99 %] 97 %  BP: (109-130)/(71-85) 121/85     Weight: 91.2 kg (201 lb)  Body mass index is 28.84 kg/m².    FHT: 135Cat 1 (reassuring)  TOCO:  Q 5-6 minutes    Physical Exam:   Constitutional: She appears well-developed and well-nourished.    HENT:   Head: Normocephalic and atraumatic.     Neck: Normal range of motion. Neck supple.    Cardiovascular: Normal rate and normal heart sounds.     Pulmonary/Chest: Effort normal and breath sounds normal.        Abdominal: Soft.   gravid     Genitourinary: Vaginal discharge (positive nitrazene) found.           Musculoskeletal: Normal range of motion and moves all extremeties.       Neurological: She is alert. She has normal reflexes.    Skin: Skin is warm and dry.    Psychiatric: She has a normal mood and affect. Her behavior is normal. Judgment and thought content normal.       Cervix:  Dilation:  4  Effacement:  70  Station: -2  Presentation: Vertex     Significant Labs:  Lab Results   Component Value Date    GROUPTRH O POS 03/08/2019    HEPBSAG Negative 03/08/2019    STREPBCULT No Group B Streptococcus isolated 07/10/2019       I have personallly reviewed all pertinent lab results from the last 24 hours.

## 2019-08-08 NOTE — H&P
[facility] -   Obstetrics  History & Physical    Patient Name: Sisi Culver  MRN: [id number]  Admission Date: 2019  Primary Care Provider: Primary Doctor No    Subjective:     Principal Problem:Threatened labor at term    History of Present Illness:  19 yo , 40 weeks presents in labor      Obstetric HPI:  Patient reports  contractions, active fetal movement, No vaginal bleeding , Yes loss of fluid     This pregnancy has been complicated by chlamydia    OB History    Para Term  AB Living   1 0 0 0 0 0   SAB TAB Ectopic Multiple Live Births   0 0 0 0 0      # Outcome Date GA Lbr Danny/2nd Weight Sex Delivery Anes PTL Lv   1 Current              History reviewed. No pertinent past medical history.  History reviewed. No pertinent surgical history.    Facility-Administered Medications Prior to Admission   Medication    levonorgestrel 20 mcg/24 hours (5 yrs) 52 mg IUD 1 Intra Uterine Device     PTA Medications   Medication Sig    ondansetron (ZOFRAN-ODT) 8 MG TbDL DIS ONE T PO Q 8 H    prenatal vit103-iron fum-folic () 27 mg iron- 1 mg Tab Take 1 tablet by mouth once daily.       Review of patient's allergies indicates:  No Known Allergies     Family History     None        Tobacco Use    Smoking status: Never Smoker    Smokeless tobacco: Never Used   Substance and Sexual Activity    Alcohol use: No    Drug use: No    Sexual activity: Yes     Partners: Male     Birth control/protection: None     Review of Systems   Constitutional: Negative for activity change and appetite change.   HENT: Negative for tinnitus.    Eyes: Negative for visual disturbance.   Respiratory: Negative for cough.    Cardiovascular: Negative for chest pain.   Gastrointestinal: Positive for abdominal pain. Negative for nausea and vomiting.   Genitourinary: Positive for pelvic pain and vaginal discharge. Negative for vaginal bleeding.   Musculoskeletal: Negative for back pain.   Neurological: Negative  for headaches.      Objective:     Vital Signs (Most Recent):  Temp: 98.3 °F (36.8 °C) (19 1134)  Pulse: 76 (19 1500)  Resp: 19 (19 1500)  BP: 121/85 (19 1334)  SpO2: 97 % (19 1500) Vital Signs (24h Range):  Temp:  [98.3 °F (36.8 °C)] 98.3 °F (36.8 °C)  Pulse:  [70-96] 76  Resp:  [18-20] 19  SpO2:  [96 %-99 %] 97 %  BP: (109-130)/(71-85) 121/85     Weight: 91.2 kg (201 lb)  Body mass index is 28.84 kg/m².    FHT: 135Cat 1 (reassuring)  TOCO:  Q 5-6 minutes    Physical Exam:   Constitutional: She appears well-developed and well-nourished.    HENT:   Head: Normocephalic and atraumatic.     Neck: Normal range of motion. Neck supple.    Cardiovascular: Normal rate and normal heart sounds.     Pulmonary/Chest: Effort normal and breath sounds normal.        Abdominal: Soft.   gravid     Genitourinary: Vaginal discharge (positive nitrazene) found.           Musculoskeletal: Normal range of motion and moves all extremeties.       Neurological: She is alert. She has normal reflexes.    Skin: Skin is warm and dry.    Psychiatric: She has a normal mood and affect. Her behavior is normal. Judgment and thought content normal.       Cervix:  Dilation:  4  Effacement:  70  Station: -2  Presentation: Vertex     Significant Labs:  Lab Results   Component Value Date    GROUPTRH O POS 2019    HEPBSAG Negative 2019    STREPBCULT No Group B Streptococcus isolated 07/10/2019       I have personallly reviewed all pertinent lab results from the last 24 hours.    Assessment/Plan:     18 y.o. female  at 40w0d for:    * Threatened labor at term  , 40 wks labor  Admit  4/70%  SROM clear fluid  GBS neg  Continuous monitoring  Pitocin Augmentation  Epidural upon request        Lisa Sanchez CNM  Obstetrics  [facility] -

## 2019-08-08 NOTE — ANESTHESIA PREPROCEDURE EVALUATION
08/08/2019  Sisi Culver is a 18 y.o., female.    Anesthesia Evaluation    I have reviewed the Patient Summary Reports.    I have reviewed the Nursing Notes.   I have reviewed the Medications.     Review of Systems  Anesthesia Hx:  No problems with previous Anesthesia Denies Hx of Anesthetic complications  Neg history of prior surgery. Denies Family Hx of Anesthesia complications.   Denies Personal Hx of Anesthesia complications.   Social:  Non-Smoker, No Alcohol Use    Hematology/Oncology:  Hematology Normal   Oncology Normal     EENT/Dental:EENT/Dental Normal   Cardiovascular:  Cardiovascular Normal Exercise tolerance: good  NYHA Classification I ECG has been reviewed.    Pulmonary:  Pulmonary Normal    Renal/:  Renal/ Normal     Hepatic/GI:  Hepatic/GI Normal    Musculoskeletal:  Musculoskeletal Normal    Neurological:  Neurology Normal    Endocrine:  Endocrine Normal    Dermatological:  Skin Normal    Psych:  Psychiatric Normal           Physical Exam  General:  Well nourished    Airway/Jaw/Neck:  Airway Findings: Mouth Opening: Normal Tongue: Normal  General Airway Assessment: Adult  Mallampati: II  TM Distance: 4 - 6 cm  Jaw/Neck Findings:  Neck ROM: Normal ROM      Dental:  Dental Findings: In tact   Chest/Lungs:  Chest/Lungs Findings: Clear to auscultation, Normal Respiratory Rate     Heart/Vascular:  Heart Findings: Rate: Normal  Rhythm: Regular Rhythm  Sounds: Normal        Mental Status:  Mental Status Findings:  Cooperative, Alert and Oriented         Anesthesia Plan  Type of Anesthesia, risks & benefits discussed:  Anesthesia Type:  epidural, spinal  Patient's Preference:   Intra-op Monitoring Plan:   Intra-op Monitoring Plan Comments:   Post Op Pain Control Plan: multimodal analgesia  Post Op Pain Control Plan Comments:   Induction:   IV  Beta Blocker:         Informed Consent: Patient  understands risks and agrees with Anesthesia plan.  Questions answered. Anesthesia consent signed with patient.  ASA Score: 2     Day of Surgery Review of History & Physical: I have interviewed and examined the patient. I have reviewed the patient's H&P dated:  There are no significant changes.  H&P update referred to the surgeon.  H&P completed by Anesthesiologist.       Ready For Surgery From Anesthesia Perspective.

## 2019-08-08 NOTE — HOSPITAL COURSE
, 40 wks labor  Admit  4/70%  SROM clear fluid  GBS neg  Continuous monitoring  Pitocin Augmentation  Epidural upon request  19  @ 1652  19 PPD # 1 doing well  8/10/19-PPD2, d/c home

## 2019-08-09 PROCEDURE — 25000003 PHARM REV CODE 250: Performed by: MIDWIFE

## 2019-08-09 PROCEDURE — 11000001 HC ACUTE MED/SURG PRIVATE ROOM

## 2019-08-09 PROCEDURE — 99231 SBSQ HOSP IP/OBS SF/LOW 25: CPT | Mod: ,,, | Performed by: ADVANCED PRACTICE MIDWIFE

## 2019-08-09 PROCEDURE — 99231 PR SUBSEQUENT HOSPITAL CARE,LEVL I: ICD-10-PCS | Mod: ,,, | Performed by: ADVANCED PRACTICE MIDWIFE

## 2019-08-09 RX ADMIN — IBUPROFEN 600 MG: 600 TABLET ORAL at 05:08

## 2019-08-09 RX ADMIN — IBUPROFEN 600 MG: 600 TABLET ORAL at 12:08

## 2019-08-09 RX ADMIN — HYDROCODONE BITARTRATE AND ACETAMINOPHEN 1 TABLET: 5; 325 TABLET ORAL at 05:08

## 2019-08-09 RX ADMIN — HYDROCODONE BITARTRATE AND ACETAMINOPHEN 1 TABLET: 5; 325 TABLET ORAL at 12:08

## 2019-08-09 RX ADMIN — IBUPROFEN 600 MG: 600 TABLET ORAL at 11:08

## 2019-08-09 RX ADMIN — HYDROCODONE BITARTRATE AND ACETAMINOPHEN 1 TABLET: 5; 325 TABLET ORAL at 01:08

## 2019-08-09 NOTE — SUBJECTIVE & OBJECTIVE
Hospital course: , 40 wks labor  Admit  4/70%  SROM clear fluid  GBS neg  Continuous monitoring  Pitocin Augmentation  Epidural upon request  19  @ 1659  19 PPD # 1 doing well    Interval History:     She is doing well this morning. She is tolerating a regular diet without nausea or vomiting. She is voiding spontaneously. She is ambulating. She has passed flatus, and has not a BM. Vaginal bleeding is mild. She denies fever or chills. Abdominal pain is mild and controlled with oral medications. She is breastfeeding. She desires circumcision for her male baby: not applicable.    Objective:     Vital Signs (Most Recent):  Temp: 97.8 °F (36.6 °C) (19 0000)  Pulse: 82 (19 0000)  Resp: 18 (19 0000)  BP: 130/72 (19 0000)  SpO2: 100 % (19 1634) Vital Signs (24h Range):  Temp:  [97.8 °F (36.6 °C)-99 °F (37.2 °C)] 97.8 °F (36.6 °C)  Pulse:  [67-97] 82  Resp:  [18-20] 18  SpO2:  [96 %-100 %] 100 %  BP: (109-140)/(68-85) 130/72     Weight: 91.2 kg (201 lb)  Body mass index is 28.84 kg/m².      Intake/Output Summary (Last 24 hours) at 2019 0810  Last data filed at 2019 0000  Gross per 24 hour   Intake --   Output 1850 ml   Net -1850 ml       Significant Labs:  Lab Results   Component Value Date    GROUPTRH O POS 2019    HEPBSAG Negative 2019    STREPBCULT No Group B Streptococcus isolated 07/10/2019     Recent Labs   Lab 19  1533   HGB 12.4   HCT 35.8*       I have personallly reviewed all pertinent lab results from the last 24 hours.    Physical Exam:   Constitutional: She is oriented to person, place, and time. She appears well-developed and well-nourished.        Pulmonary/Chest: Effort normal.        Abdominal: Soft.     Genitourinary: Uterus normal.           Musculoskeletal: Normal range of motion and moves all extremeties.       Neurological: She is alert and oriented to person, place, and time.    Skin: Skin is warm and dry.    Psychiatric: She has  a normal mood and affect. Her behavior is normal.

## 2019-08-09 NOTE — LACTATION NOTE
Lactation Rounds:     Visited mother at bedside. She stated that infant latched well for first feeding. Mother plans to breastfeed and bottle feed formula, but she is undecided about when she plans to introduce formula.  Discussed management of feeding plan and options, including pumping. Encouraged mother to continue to think about her feeding goals and to ask questions as needed. Admit teaching done, including feeding on demand, recognition of feeding cues, expectations for infant feeding/behavior in first day of life, importance of skin to skin contact, hand expression. Encouraged mother to call for feeding assistance or with any questions or concerns. She verbalized understanding.

## 2019-08-09 NOTE — LACTATION NOTE
This note was copied from a baby's chart.  Lactation called to room:   Assisted mother into cross cradle hold to the left breast. Nipple inverted. Infant able to latch. No audible swallows noted. Infant changed to right breast in cross cradle hold. Infant able to obtain an adequate latch. No audible swallows noted. Mother also formula feeding via bottle. Mother to call for assistance as needed.      08/09/19 1238   Maternal Infant Feeding   Maternal Emotional State assist needed   Infant Positioning clutch/football   Nipple Shape After Feeding, Left wnl   Nipple Shape After Feeding, Right wnl

## 2019-08-09 NOTE — PROGRESS NOTES
[facility] -   Obstetrics  Postpartum Progress Note    Patient Name: Sisi Culver  MRN: [id number]  Admission Date: 2019  Hospital Length of Stay: 1 days  Attending Physician: Yanet Bowman MD  Primary Care Provider: Primary Doctor No    Subjective:     Principal Problem:Amniotic fluid leaking    Hospital course: , 40 wks labor  Admit  4/70%  SROM clear fluid  GBS neg  Continuous monitoring  Pitocin Augmentation  Epidural upon request  19  @ 1659  19 PPD # 1 doing well    Interval History:     She is doing well this morning. She is tolerating a regular diet without nausea or vomiting. She is voiding spontaneously. She is ambulating. She has passed flatus, and has not a BM. Vaginal bleeding is mild. She denies fever or chills. Abdominal pain is mild and controlled with oral medications. She is breastfeeding. She desires circumcision for her male baby: not applicable.    Objective:     Vital Signs (Most Recent):  Temp: 97.8 °F (36.6 °C) (19 0000)  Pulse: 82 (19 0000)  Resp: 18 (19 0000)  BP: 130/72 (19 0000)  SpO2: 100 % (19 1634) Vital Signs (24h Range):  Temp:  [97.8 °F (36.6 °C)-99 °F (37.2 °C)] 97.8 °F (36.6 °C)  Pulse:  [67-97] 82  Resp:  [18-20] 18  SpO2:  [96 %-100 %] 100 %  BP: (109-140)/(68-85) 130/72     Weight: 91.2 kg (201 lb)  Body mass index is 28.84 kg/m².      Intake/Output Summary (Last 24 hours) at 2019 0810  Last data filed at 2019 0000  Gross per 24 hour   Intake --   Output 1850 ml   Net -1850 ml       Significant Labs:  Lab Results   Component Value Date    GROUPTRH O POS 2019    HEPBSAG Negative 2019    STREPBCULT No Group B Streptococcus isolated 07/10/2019     Recent Labs   Lab 19  1533   HGB 12.4   HCT 35.8*       I have personallly reviewed all pertinent lab results from the last 24 hours.    Physical Exam:   Constitutional: She is oriented to person, place, and time. She appears well-developed  and well-nourished.        Pulmonary/Chest: Effort normal.        Abdominal: Soft.     Genitourinary: Uterus normal.           Musculoskeletal: Normal range of motion and moves all extremeties.       Neurological: She is alert and oriented to person, place, and time.    Skin: Skin is warm and dry.    Psychiatric: She has a normal mood and affect. Her behavior is normal.       Assessment/Plan:     18 y.o. female  for:    No notes have been filed under this hospital service.  Service: Obstetrics and Gynecology      Disposition: As patient meets milestones, will plan to discharge 8/10/19.    Galina Lira CNM  Obstetrics  [facility]

## 2019-08-09 NOTE — LACTATION NOTE
Lactation Rounds:    Infants weight loss wnl. Infants intake and output wnl. Reinforced infant feeding & output pattern, cue based feeds & unrestricted access to the breast. Instructed mother to feed 8 or more times in 24 hours. Hand expression reviewed. Benefits of skin to skin and rooming in discussed. Mother feeding on right side only and states the infant does not want to latch to the left. Encouraged mother to call for assistance with next feeding for latch assessment. Mother denies any further needs or concerns at this time. Mother verbalizes understanding of education.     08/09/19 1045   Maternal Infant Feeding   Maternal Emotional State relaxed

## 2019-08-10 VITALS
WEIGHT: 201 LBS | TEMPERATURE: 98 F | HEIGHT: 70 IN | BODY MASS INDEX: 28.77 KG/M2 | SYSTOLIC BLOOD PRESSURE: 126 MMHG | HEART RATE: 62 BPM | RESPIRATION RATE: 18 BRPM | OXYGEN SATURATION: 100 % | DIASTOLIC BLOOD PRESSURE: 82 MMHG

## 2019-08-10 PROCEDURE — 99238 PR HOSPITAL DISCHARGE DAY,<30 MIN: ICD-10-PCS | Mod: ,,, | Performed by: ADVANCED PRACTICE MIDWIFE

## 2019-08-10 PROCEDURE — 99238 HOSP IP/OBS DSCHRG MGMT 30/<: CPT | Mod: ,,, | Performed by: ADVANCED PRACTICE MIDWIFE

## 2019-08-10 PROCEDURE — 25000003 PHARM REV CODE 250: Performed by: MIDWIFE

## 2019-08-10 RX ORDER — IBUPROFEN 600 MG/1
600 TABLET ORAL EVERY 6 HOURS
Qty: 30 TABLET | Refills: 0 | Status: SHIPPED | OUTPATIENT
Start: 2019-08-10 | End: 2021-09-28

## 2019-08-10 RX ADMIN — IBUPROFEN 600 MG: 600 TABLET ORAL at 05:08

## 2019-08-10 NOTE — DISCHARGE INSTRUCTIONS

## 2019-08-10 NOTE — DISCHARGE SUMMARY
[facility] -   Obstetrics  Discharge Summary      Patient Name: Sisi Culver  MRN: [id number]  Admission Date: 2019  Hospital Length of Stay: 2 days  Discharge Date and Time: No discharge date for patient encounter.  Attending Physician: Yanet Bowman MD   Discharging Provider: Meera Beasley CNM   Primary Care Provider: Primary Doctor No    HPI: 19 yo , 40 weeks presents in labor          * No surgery found *     Hospital Course:   , 40 wks labor  Admit  4/70%  SROM clear fluid  GBS neg  Continuous monitoring  Pitocin Augmentation  Epidural upon request  19  @ 1659  19 PPD # 1 doing well  8/10/19-PPD2, d/c home         Final Active Diagnoses:    Diagnosis Date Noted POA    Vaginal delivery [O80] 2019 Not Applicable    Single live birth [Z37.0] 2019 Not Applicable      Problems Resolved During this Admission:    Diagnosis Date Noted Date Resolved POA    PRINCIPAL PROBLEM:  Amniotic fluid leaking [O42.90] 2019 Unknown    Threatened labor at term [O47.9] 2019 Yes    Encounter for supervision of normal first pregnancy in third trimester [Z34.03] 2019 Not Applicable        Labs: All labs within the past 24 hours have been reviewed    Feeding Method: breast    Immunizations     Date Immunization Status Dose Route/Site Given by    08/10/19 0748 MMR Deferred 0.5 mL Subcutaneous/Left deltoid Brittnee Webb RN    08/10/19 0749 Tdap Deferred 0.5 mL Intramuscular/Left deltoid Brittnee Webb RN          Delivery:    Episiotomy: None   Lacerations: None   Repair suture: None   Repair # of packets:     Blood loss (ml): 50     Birth information:  YOB: 2021   Time of birth: 4:59 PM   Sex: female   Delivery type: Vaginal, Spontaneous   Gestational Age: 40w0d    Delivery Clinician:      Other providers:       Additional  information:  Forceps:    Vacuum:    Breech:    Observed anomalies       Living?:           APGARS  One minute Five minutes Ten minutes   Skin color:         Heart rate:         Grimace:         Muscle tone:         Breathing:         Totals: 9  9        Placenta: Delivered:       appearance    Pending Diagnostic Studies:     None          Discharged Condition: stable    Disposition: Home or Self Care    Follow Up:  Follow-up Information     Follow up In 4 weeks.    Why:  PP visit               Patient Instructions:      Notify your health care provider if you experience any of the following:  temperature >100.4     Notify your health care provider if you experience any of the following:  persistent nausea and vomiting or diarrhea     Notify your health care provider if you experience any of the following:  severe uncontrolled pain     Notify your health care provider if you experience any of the following:  redness, tenderness, or signs of infection (pain, swelling, redness, odor or green/yellow discharge around incision site)     Medications:  Current Discharge Medication List      START taking these medications    Details   ibuprofen (ADVIL,MOTRIN) 600 MG tablet Take 1 tablet (600 mg total) by mouth every 6 (six) hours.  Qty: 30 tablet, Refills: 0         CONTINUE these medications which have NOT CHANGED    Details   ondansetron (ZOFRAN-ODT) 8 MG TbDL DIS ONE T PO Q 8 H  Refills: 1      prenatal vit103-iron fum-folic () 27 mg iron- 1 mg Tab Take 1 tablet by mouth once daily.  Qty: 28 tablet, Refills: 6    Associated Diagnoses: Encounter for supervision of normal first pregnancy in third trimester             Meera Beasley CNM  Obstetrics  [facility]

## 2019-08-10 NOTE — LACTATION NOTE
Lactation Rounds: infant output and weight loss WNL. Mother states that her breast are beginning to fill. She also reports infant is beginning to feed on both breast and she is hearing swallows while infant breastfeeds. Strongly encouraged mother to call for latch & feeding assessment prior to discharge.    Discussed hand expression. Mother return demonstrates on left breast, milk easily expressed. Nipple care discussed and preformed. She was instructed to:   Sit upright and lean forward, if possible.   When feasible, apply warm, wet compress over breasts for a few minutes.    Perform gentle breast massage.   Form a C with her hand and place it about 1 inch back from the areola with the nipple centered between her index finger and her thumb.   Press, compress, relax:  Using her finger and thumb, apply pressure in an inward direction toward the breast without stretching the tissue, compress the breast tissue between her finger and thumb, then relax her finger and thumb. Repeat process for a few minutes.   Rotate placement of finger and thumb on the breasts to facilitate emptying.   Collect expressed breastmilk/colostrum with a spoon or cup and feed immediately to the baby, if able.   If unable to feed immediately, place breastmilk/colostrum directly into a sterile storage container for later use. Place the babys breast milk label (with the date and time of collection and the names of mother's medications) on the container. Reviewed proper handling and storage of expressed breastmilk.     Mother given resources on how to obtain breast pump through [employer]'s [employer]. Mother denies any further lactation needs or concerns at this time. Mother anticipates discharge home today. Lactation discharge information reviewed.  Mother is aware of warm line and outpatient consultations. Encouraged mother to contact lactation with any questions, concerns, or problems. Contact numbers provided, and mother  verbalizes understanding.

## 2019-08-13 ENCOUNTER — PATIENT MESSAGE (OUTPATIENT)
Dept: OBSTETRICS AND GYNECOLOGY | Facility: CLINIC | Age: 19
End: 2019-08-13

## 2019-08-14 ENCOUNTER — TELEPHONE (OUTPATIENT)
Dept: OBSTETRICS AND GYNECOLOGY | Facility: CLINIC | Age: 19
End: 2019-08-14

## 2019-08-14 NOTE — TELEPHONE ENCOUNTER
----- Message from Becky De La Cruz sent at 8/14/2019 11:18 AM CDT -----  Patient needs call back rg rx for breast pump..[phone]

## 2019-08-15 NOTE — TELEPHONE ENCOUNTER
Please see telephone encounter. Patient was informed that a message has been sent to the provider.

## 2019-08-15 NOTE — TELEPHONE ENCOUNTER
Spoke with patient, patient is trying to get breast pump ordered. Checked with Ashley, she informed the patient that she will f/u after clinic today, and instructed patient to send [employer] message.

## 2019-08-16 ENCOUNTER — PATIENT MESSAGE (OUTPATIENT)
Dept: OBSTETRICS AND GYNECOLOGY | Facility: CLINIC | Age: 19
End: 2019-08-16

## 2019-08-16 DIAGNOSIS — Z78.9 BREASTFEEDING (INFANT): Primary | ICD-10-CM

## 2019-09-16 ENCOUNTER — POSTPARTUM VISIT (OUTPATIENT)
Dept: OBSTETRICS AND GYNECOLOGY | Facility: CLINIC | Age: 19
End: 2019-09-16
Payer: MEDICAID

## 2019-09-16 ENCOUNTER — LAB VISIT (OUTPATIENT)
Dept: LAB | Facility: HOSPITAL | Age: 19
End: 2019-09-16
Attending: NURSE PRACTITIONER
Payer: MEDICAID

## 2019-09-16 VITALS — BODY MASS INDEX: 28.84 KG/M2 | HEIGHT: 70 IN

## 2019-09-16 DIAGNOSIS — Z30.014 ENCOUNTER FOR INITIAL PRESCRIPTION OF INTRAUTERINE CONTRACEPTIVE DEVICE (IUD): ICD-10-CM

## 2019-09-16 DIAGNOSIS — R42 VERTIGO: Primary | ICD-10-CM

## 2019-09-16 DIAGNOSIS — R42 VERTIGO: ICD-10-CM

## 2019-09-16 PROBLEM — A74.9 CHLAMYDIA INFECTION AFFECTING PREGNANCY IN SECOND TRIMESTER, ANTEPARTUM: Status: RESOLVED | Noted: 2019-03-11 | Resolved: 2019-09-16

## 2019-09-16 PROBLEM — O98.812 CHLAMYDIA INFECTION AFFECTING PREGNANCY IN SECOND TRIMESTER, ANTEPARTUM: Status: RESOLVED | Noted: 2019-03-11 | Resolved: 2019-09-16

## 2019-09-16 LAB
ALBUMIN SERPL BCP-MCNC: 4.1 G/DL (ref 3.5–5.2)
ALP SERPL-CCNC: 86 U/L (ref 55–135)
ALT SERPL W/O P-5'-P-CCNC: 11 U/L (ref 10–44)
ANION GAP SERPL CALC-SCNC: 11 MMOL/L (ref 8–16)
AST SERPL-CCNC: 14 U/L (ref 10–40)
BASOPHILS # BLD AUTO: 0.01 K/UL (ref 0–0.2)
BASOPHILS NFR BLD: 0.2 % (ref 0–1.9)
BILIRUB SERPL-MCNC: 0.8 MG/DL (ref 0.1–1)
BUN SERPL-MCNC: 4 MG/DL (ref 6–20)
CALCIUM SERPL-MCNC: 9.6 MG/DL (ref 8.7–10.5)
CHLORIDE SERPL-SCNC: 105 MMOL/L (ref 95–110)
CO2 SERPL-SCNC: 27 MMOL/L (ref 23–29)
CREAT SERPL-MCNC: 0.9 MG/DL (ref 0.5–1.4)
DIFFERENTIAL METHOD: ABNORMAL
EOSINOPHIL # BLD AUTO: 0.1 K/UL (ref 0–0.5)
EOSINOPHIL NFR BLD: 1.8 % (ref 0–8)
ERYTHROCYTE [DISTWIDTH] IN BLOOD BY AUTOMATED COUNT: 12.4 % (ref 11.5–14.5)
EST. GFR  (AFRICAN AMERICAN): >60 ML/MIN/1.73 M^2
EST. GFR  (NON AFRICAN AMERICAN): >60 ML/MIN/1.73 M^2
GLUCOSE SERPL-MCNC: 81 MG/DL (ref 70–110)
HCT VFR BLD AUTO: 40.5 % (ref 37–48.5)
HGB BLD-MCNC: 13.6 G/DL (ref 12–16)
IMM GRANULOCYTES # BLD AUTO: 0.01 K/UL (ref 0–0.04)
IMM GRANULOCYTES NFR BLD AUTO: 0.2 % (ref 0–0.5)
LYMPHOCYTES # BLD AUTO: 2.3 K/UL (ref 1–4.8)
LYMPHOCYTES NFR BLD: 39.6 % (ref 18–48)
MCH RBC QN AUTO: 29.6 PG (ref 27–31)
MCHC RBC AUTO-ENTMCNC: 33.6 G/DL (ref 32–36)
MCV RBC AUTO: 88 FL (ref 82–98)
MONOCYTES # BLD AUTO: 0.6 K/UL (ref 0.3–1)
MONOCYTES NFR BLD: 9.7 % (ref 4–15)
NEUTROPHILS # BLD AUTO: 2.8 K/UL (ref 1.8–7.7)
NEUTROPHILS NFR BLD: 48.5 % (ref 38–73)
NRBC BLD-RTO: 0 /100 WBC
PLATELET # BLD AUTO: 156 K/UL (ref 150–350)
PMV BLD AUTO: 13.1 FL (ref 9.2–12.9)
POTASSIUM SERPL-SCNC: 3.7 MMOL/L (ref 3.5–5.1)
PROT SERPL-MCNC: 7.4 G/DL (ref 6–8.4)
RBC # BLD AUTO: 4.6 M/UL (ref 4–5.4)
SODIUM SERPL-SCNC: 143 MMOL/L (ref 136–145)
WBC # BLD AUTO: 5.68 K/UL (ref 3.9–12.7)

## 2019-09-16 PROCEDURE — 85025 COMPLETE CBC W/AUTO DIFF WBC: CPT

## 2019-09-16 PROCEDURE — 99213 OFFICE O/P EST LOW 20 MIN: CPT | Mod: PBBFAC | Performed by: MIDWIFE

## 2019-09-16 PROCEDURE — 99999 PR PBB SHADOW E&M-EST. PATIENT-LVL III: ICD-10-PCS | Mod: PBBFAC,,, | Performed by: MIDWIFE

## 2019-09-16 PROCEDURE — 36415 COLL VENOUS BLD VENIPUNCTURE: CPT

## 2019-09-16 PROCEDURE — 58300 INSERT INTRAUTERINE DEVICE: CPT | Mod: PBBFAC | Performed by: MIDWIFE

## 2019-09-16 PROCEDURE — 99999 PR PBB SHADOW E&M-EST. PATIENT-LVL III: CPT | Mod: PBBFAC,,, | Performed by: MIDWIFE

## 2019-09-16 PROCEDURE — 80053 COMPREHEN METABOLIC PANEL: CPT

## 2019-09-16 PROCEDURE — 59430 PR CARE AFTER DELIVERY ONLY: ICD-10-PCS | Mod: ,,, | Performed by: MIDWIFE

## 2019-09-16 PROCEDURE — 58300 INSERTION OF IUD: ICD-10-PCS | Mod: S$PBB,,, | Performed by: MIDWIFE

## 2019-09-16 PROCEDURE — 58300 INSERT INTRAUTERINE DEVICE: CPT | Mod: S$PBB,,, | Performed by: MIDWIFE

## 2019-09-16 RX ADMIN — LEVONORGESTREL 1 INTRA UTERINE DEVICE: 52 INTRAUTERINE DEVICE INTRAUTERINE at 02:09

## 2019-09-16 NOTE — PROGRESS NOTES
"Subjective:       Sisi Culver is a 18 y.o. female who presents for a postpartum visit. She is 5 weeks postpartum following a spontaneous vaginal delivery. I have fully reviewed the prenatal and intrapartum course. The delivery was at 40 gestational weeks. Outcome: spontaneous vaginal delivery. Anesthesia: none. Postpartum course has been normal. Baby's course has been normal. Baby is feeding by breast. Bleeding no bleeding. Bowel function is normal. Bladder function is normal. Patient is not sexually active. Contraception method is mirena. Postpartum depression screening: negative. Reports lightheaded and dizziness with headaches twice a day.    The following portions of the patient's history were reviewed and updated as appropriate: allergies, current medications, past family history, past medical history, past social history, past surgical history and problem list.    Review of Systems  Pertinent items are noted in HPI.     Objective:      Ht 5' 10" (1.778 m)   LMP 11/15/2018   BMI 28.84 kg/m²    General:  alert, appears stated age and cooperative   Abdomen: soft, non-tender; bowel sounds normal; no masses,  no organomegaly    Vulva:  normal   Vagina: normal vagina   Cervix:  no cervical motion tenderness and no lesions   Corpus: normal   Adnexa:  normal adnexa          Assessment:      Normal postpartum exam. Pap smear not done at today's visit.     Plan:      1. Contraception: mirena  2. CBC, CMP  3. Follow up in: 1 months or as needed.   "

## 2019-09-16 NOTE — PROCEDURES
Insertion of IUD  Date/Time: 9/16/2019 2:31 PM  Performed by: Lisa Sanchez CNM  Authorized by: Lisa Sanchez CNM     Consent:     Consent obtained:  Verbal and written    Consent given by:  Patient    Procedure risks and benefits discussed: yes      Patient questions answered: yes      Patient agrees, verbalizes understanding, and wants to proceed: yes      Educational handouts given: yes      Instructions and paperwork completed: yes    Procedure:     Pelvic exam performed: yes      Negative GC/chlamydia test: no      Negative urine pregnancy test: no      Negative serum pregnancy test: no      Cervix cleaned and prepped: yes      Speculum placed in vagina: yes      Tenaculum applied to cervix: no      Uterus sounded: yes      Uterus sound depth (cm):  7    IUD inserted with no complications: yes      IUD type:  Mirena    Strings trimmed: yes    Post-procedure:     Patient tolerated procedure well: yes      Patient will follow up after next period: yes

## 2019-09-24 ENCOUNTER — TELEPHONE (OUTPATIENT)
Dept: PHARMACY | Facility: CLINIC | Age: 19
End: 2019-09-24

## 2019-09-24 NOTE — TELEPHONE ENCOUNTER
Please deliver to [facility] at [address] [place] [address] [place] [address], [address], 3rd floor, OBGYN.

## 2019-10-14 ENCOUNTER — OFFICE VISIT (OUTPATIENT)
Dept: OBSTETRICS AND GYNECOLOGY | Facility: CLINIC | Age: 19
End: 2019-10-14
Payer: MEDICAID

## 2019-10-14 VITALS — DIASTOLIC BLOOD PRESSURE: 74 MMHG | BODY MASS INDEX: 25.18 KG/M2 | WEIGHT: 175.5 LBS | SYSTOLIC BLOOD PRESSURE: 118 MMHG

## 2019-10-14 DIAGNOSIS — Z30.431 IUD CHECK UP: Primary | ICD-10-CM

## 2019-10-14 PROCEDURE — 99999 PR PBB SHADOW E&M-EST. PATIENT-LVL II: ICD-10-PCS | Mod: PBBFAC,,, | Performed by: MIDWIFE

## 2019-10-14 PROCEDURE — 99999 PR PBB SHADOW E&M-EST. PATIENT-LVL II: CPT | Mod: PBBFAC,,, | Performed by: MIDWIFE

## 2019-10-14 PROCEDURE — 99212 OFFICE O/P EST SF 10 MIN: CPT | Mod: PBBFAC | Performed by: MIDWIFE

## 2019-10-14 PROCEDURE — 99212 PR OFFICE/OUTPT VISIT, EST, LEVL II, 10-19 MIN: ICD-10-PCS | Mod: S$PBB,24,, | Performed by: MIDWIFE

## 2019-10-14 PROCEDURE — 99212 OFFICE O/P EST SF 10 MIN: CPT | Mod: S$PBB,24,, | Performed by: MIDWIFE

## 2019-10-14 NOTE — PROGRESS NOTES
UD CHECK:    18 year old female patient : 1 P: 1 with LMP 10/13/19 presents for  IUD string check following insertion on 19 by Lisa Sanchez CNM. She is not having any problems with bleeding, cramping, fever or discharge. She is able to feel the strings.    EXAM:  External female genitalia is without lesions.  Vagina is without lesions or discharge.  Cervix is pink without lesions, discharge or tenderness; IUD strings are visible.  Uterus is nontender and strings are palpable.  Adnexa are nontender and without masses.    ASSESSMENT:  1. IUD check / IUD in situ.    COUNSELING:  IUD danger signs and how to check the strings reviewed.    Counseling lasted approximately 10 minutes and all her questions were answered.    FOLLOW-UP for annual gyn exam or prn

## 2019-11-23 ENCOUNTER — HOSPITAL ENCOUNTER (EMERGENCY)
Facility: HOSPITAL | Age: 19
Discharge: HOME | End: 2019-11-23
Attending: FAMILY MEDICINE
Payer: MEDICAID

## 2019-11-23 VITALS
SYSTOLIC BLOOD PRESSURE: 118 MMHG | HEIGHT: 70 IN | RESPIRATION RATE: 18 BRPM | WEIGHT: 177.25 LBS | OXYGEN SATURATION: 100 % | TEMPERATURE: 99 F | DIASTOLIC BLOOD PRESSURE: 75 MMHG | BODY MASS INDEX: 25.38 KG/M2 | HEART RATE: 80 BPM

## 2019-11-23 DIAGNOSIS — Z04.1 ENCOUNTER FOR EXAMINATION FOLLOWING MOTOR VEHICLE COLLISION (MVC): Primary | ICD-10-CM

## 2019-11-23 DIAGNOSIS — R51.9 NONINTRACTABLE HEADACHE, UNSPECIFIED CHRONICITY PATTERN, UNSPECIFIED HEADACHE TYPE: ICD-10-CM

## 2019-11-23 DIAGNOSIS — M79.18 MUSCULOSKELETAL PAIN: ICD-10-CM

## 2019-11-23 PROCEDURE — 99284 EMERGENCY DEPT VISIT MOD MDM: CPT

## 2019-11-23 PROCEDURE — 25000003 PHARM REV CODE 250: Performed by: NURSE PRACTITIONER

## 2019-11-23 RX ORDER — HYDROCODONE BITARTRATE AND ACETAMINOPHEN 10; 325 MG/1; MG/1
1 TABLET ORAL
Status: COMPLETED | OUTPATIENT
Start: 2019-11-23 | End: 2019-11-23

## 2019-11-23 RX ORDER — BACLOFEN 10 MG/1
10 TABLET ORAL 3 TIMES DAILY PRN
Qty: 20 TABLET | Refills: 0 | Status: SHIPPED | OUTPATIENT
Start: 2019-11-23 | End: 2021-09-28

## 2019-11-23 RX ORDER — KETOROLAC TROMETHAMINE 10 MG/1
10 TABLET, FILM COATED ORAL EVERY 6 HOURS PRN
Qty: 15 TABLET | Refills: 0 | Status: SHIPPED | OUTPATIENT
Start: 2019-11-23 | End: 2019-11-28

## 2019-11-23 RX ORDER — METHOCARBAMOL 500 MG/1
1000 TABLET, FILM COATED ORAL
Status: COMPLETED | OUTPATIENT
Start: 2019-11-23 | End: 2019-11-23

## 2019-11-23 RX ORDER — ONDANSETRON 4 MG/1
4 TABLET, ORALLY DISINTEGRATING ORAL
Status: COMPLETED | OUTPATIENT
Start: 2019-11-23 | End: 2019-11-23

## 2019-11-23 RX ADMIN — HYDROCODONE BITARTRATE AND ACETAMINOPHEN 1 TABLET: 10; 325 TABLET ORAL at 08:11

## 2019-11-23 RX ADMIN — ONDANSETRON 4 MG: 4 TABLET, ORALLY DISINTEGRATING ORAL at 08:11

## 2019-11-23 RX ADMIN — METHOCARBAMOL TABLETS 1000 MG: 500 TABLET, COATED ORAL at 08:11

## 2019-11-24 NOTE — DISCHARGE INSTRUCTIONS
Return to the ED immediately for any worsening of symptoms, or any chest pain, shortness of breath, abdominal pain, confusion, difficulty walking due to severe pain, numbness, tingling to bilateral lower extremities or timothy area, incontinence of bowel or bladder, dizziness, weakness or any concerns.
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
## 2020-05-20 ENCOUNTER — TELEPHONE (OUTPATIENT)
Dept: OBSTETRICS AND GYNECOLOGY | Facility: CLINIC | Age: 20
End: 2020-05-20

## 2020-05-20 NOTE — TELEPHONE ENCOUNTER
----- Message from Amanda Morales sent at 5/20/2020 11:01 AM CDT -----  Contact: P t  Pt called in regards to scheduling a appointment to get IUD taken out. Pt can be reached at [phone] ([email or web address]). Pt stated that she is available on Thursday 05/21/2020.

## 2020-05-21 ENCOUNTER — PROCEDURE VISIT (OUTPATIENT)
Dept: OBSTETRICS AND GYNECOLOGY | Facility: CLINIC | Age: 20
End: 2020-05-21
Payer: MEDICAID

## 2020-05-21 VITALS
WEIGHT: 190.94 LBS | DIASTOLIC BLOOD PRESSURE: 68 MMHG | BODY MASS INDEX: 28.28 KG/M2 | HEIGHT: 69 IN | SYSTOLIC BLOOD PRESSURE: 98 MMHG

## 2020-05-21 DIAGNOSIS — Z30.013 ENCOUNTER FOR INITIAL PRESCRIPTION OF INJECTABLE CONTRACEPTIVE: ICD-10-CM

## 2020-05-21 DIAGNOSIS — Z30.432 ENCOUNTER FOR IUD REMOVAL: Primary | ICD-10-CM

## 2020-05-21 PROCEDURE — 58301 REMOVE INTRAUTERINE DEVICE: CPT | Mod: S$PBB,,, | Performed by: NURSE PRACTITIONER

## 2020-05-21 PROCEDURE — 96372 THER/PROPH/DIAG INJ SC/IM: CPT | Mod: PBBFAC

## 2020-05-21 PROCEDURE — 58301 PR REMOVE, INTRAUTERINE DEVICE: ICD-10-PCS | Mod: S$PBB,,, | Performed by: NURSE PRACTITIONER

## 2020-05-21 PROCEDURE — 58301 REMOVE INTRAUTERINE DEVICE: CPT | Mod: PBBFAC | Performed by: NURSE PRACTITIONER

## 2020-05-21 RX ORDER — MEDROXYPROGESTERONE ACETATE 150 MG/ML
150 INJECTION, SUSPENSION INTRAMUSCULAR
Status: ACTIVE | OUTPATIENT
Start: 2020-05-21 | End: 2021-05-16

## 2020-05-21 RX ADMIN — MEDROXYPROGESTERONE ACETATE 150 MG: 150 INJECTION, SUSPENSION INTRAMUSCULAR at 03:05

## 2020-05-21 NOTE — PROCEDURES
Procedures     IUD CHECK:    19 year old female patient presents for Mirena/ IUD removal. She is not having any problems with bleeding, cramping, fever or discharge. She is able to feel the strings.    EXAM:  External female genitalia is without lesions.  Vagina is without lesions or discharge.  Cervix is pink without lesions, discharge or tenderness; IUD strings are seen on exam.  Uterus is nontender and strings are palpable.  Adnexa are nontender and without masses.    ASSESSMENT:  IUD removed intact  Patient started in Depo-provera injections

## 2020-05-21 NOTE — PROCEDURES
Two patient identifiers verified  Patient notified to wait 15 minutes in clinic after injection, patient verbalized understanding.   150mg/ml of depo provera administered IM to Right ventrogluteal   Patient tolerated well.   Next injection scheduled

## 2020-08-06 ENCOUNTER — CLINICAL SUPPORT (OUTPATIENT)
Dept: OBSTETRICS AND GYNECOLOGY | Facility: CLINIC | Age: 20
End: 2020-08-06
Payer: MEDICAID

## 2020-08-06 PROCEDURE — 96372 THER/PROPH/DIAG INJ SC/IM: CPT | Mod: PBBFAC

## 2020-08-06 RX ADMIN — MEDROXYPROGESTERONE ACETATE 150 MG: 150 INJECTION, SUSPENSION INTRAMUSCULAR at 11:08

## 2020-08-06 NOTE — PROGRESS NOTES
After using two patient identifiers and reviewing allergies and medications. Pt. Received depo provera injection. Instructed pt. To wait 15 minutes. Made next appt.

## 2020-11-27 ENCOUNTER — TELEPHONE (OUTPATIENT)
Dept: OBSTETRICS AND GYNECOLOGY | Facility: CLINIC | Age: 20
End: 2020-11-27

## 2020-11-27 NOTE — TELEPHONE ENCOUNTER
----- Message from Dilcia Loco sent at 11/23/2020  4:12 PM CST -----  Type:  Needs Medical Advice    Who Called:  Pt  Sisi   Symptoms (please be specific):  Pt would like to schedule a birth control injection    How long has patient had these symptoms:     Pharmacy name and phone #:     Would the patient rather a call back or a response via [facility]?   Call back   Best Call Back Number:   [phone]  Additional Information:  please call to schedule//delia/noel

## 2020-11-27 NOTE — TELEPHONE ENCOUNTER
Returned phone call to patient, patient wants to schedule depo injection. Informed that she is out of her depo window, and I will speak with provider in regards to next steps before injection. Patient voiced understanding.

## 2020-12-01 ENCOUNTER — CLINICAL SUPPORT (OUTPATIENT)
Dept: OBSTETRICS AND GYNECOLOGY | Facility: CLINIC | Age: 20
End: 2020-12-01
Payer: MEDICAID

## 2020-12-01 DIAGNOSIS — Z30.9 ENCOUNTER FOR CONTRACEPTIVE MANAGEMENT, UNSPECIFIED TYPE: Primary | ICD-10-CM

## 2020-12-01 LAB
B-HCG UR QL: NEGATIVE
CTP QC/QA: YES

## 2020-12-01 PROCEDURE — 99211 OFF/OP EST MAY X REQ PHY/QHP: CPT | Mod: PBBFAC,25

## 2020-12-01 PROCEDURE — 99999 PR PBB SHADOW E&M-EST. PATIENT-LVL I: ICD-10-PCS | Mod: PBBFAC,,,

## 2020-12-01 PROCEDURE — 96372 THER/PROPH/DIAG INJ SC/IM: CPT | Mod: PBBFAC

## 2020-12-01 PROCEDURE — 99999 PR PBB SHADOW E&M-EST. PATIENT-LVL I: CPT | Mod: PBBFAC,,,

## 2020-12-01 RX ADMIN — MEDROXYPROGESTERONE ACETATE 150 MG: 150 INJECTION, SUSPENSION INTRAMUSCULAR at 01:12

## 2020-12-01 NOTE — TELEPHONE ENCOUNTER
Spoke with patient, informed per JULIANNA Lundberg DNP, patient is fine to have a UPT prior to depo injection. Patient agreed to be scheduled today at the [facility] at the [facility] clinic. Reminded of visitor policy. Patient voiced understanding.

## 2021-04-08 ENCOUNTER — TELEPHONE (OUTPATIENT)
Dept: OBSTETRICS AND GYNECOLOGY | Facility: CLINIC | Age: 21
End: 2021-04-08

## 2021-04-09 ENCOUNTER — CLINICAL SUPPORT (OUTPATIENT)
Dept: OBSTETRICS AND GYNECOLOGY | Facility: CLINIC | Age: 21
End: 2021-04-09
Payer: MEDICAID

## 2021-04-09 DIAGNOSIS — Z30.42 DEPO-PROVERA CONTRACEPTIVE STATUS: Primary | ICD-10-CM

## 2021-04-09 PROCEDURE — 96372 THER/PROPH/DIAG INJ SC/IM: CPT | Mod: PBBFAC

## 2021-04-09 PROCEDURE — 99999 PR PBB SHADOW E&M-EST. PATIENT-LVL I: CPT | Mod: PBBFAC,,,

## 2021-04-09 PROCEDURE — 99999 PR PBB SHADOW E&M-EST. PATIENT-LVL I: ICD-10-PCS | Mod: PBBFAC,,,

## 2021-04-09 PROCEDURE — 99211 OFF/OP EST MAY X REQ PHY/QHP: CPT | Mod: PBBFAC,25

## 2021-04-09 RX ORDER — MEDROXYPROGESTERONE ACETATE 150 MG/ML
150 INJECTION, SUSPENSION INTRAMUSCULAR ONCE
Status: COMPLETED | OUTPATIENT
Start: 2021-04-09 | End: 2021-04-09

## 2021-04-09 RX ADMIN — MEDROXYPROGESTERONE ACETATE 150 MG: 150 INJECTION, SUSPENSION INTRAMUSCULAR at 10:04

## 2021-05-14 NOTE — PROGRESS NOTES
14-May-2021 16:47 Verified patient with 2 patient identifiers. Allergies and medications reviewed.   Tdap vaccine given IM to left deltoid using aseptic technique.   No discomfort noted. Patient tolerated well.     Patient advised to wait 15 minutes in lobby to monitor for reaction.   Patient verbalized understanding.   13-May-2021 03:30 19-Apr-2021 03:40 12-May-2021 21:00

## 2021-09-24 ENCOUNTER — TELEPHONE (OUTPATIENT)
Dept: OBSTETRICS AND GYNECOLOGY | Facility: CLINIC | Age: 21
End: 2021-09-24

## 2021-09-24 DIAGNOSIS — Z30.42 ENCOUNTER FOR SURVEILLANCE OF INJECTABLE CONTRACEPTIVE: Primary | ICD-10-CM

## 2021-09-27 ENCOUNTER — LAB VISIT (OUTPATIENT)
Dept: LAB | Facility: HOSPITAL | Age: 21
End: 2021-09-27
Attending: NURSE PRACTITIONER
Payer: MEDICAID

## 2021-09-27 DIAGNOSIS — Z30.42 ENCOUNTER FOR SURVEILLANCE OF INJECTABLE CONTRACEPTIVE: ICD-10-CM

## 2021-09-27 PROCEDURE — 84702 CHORIONIC GONADOTROPIN TEST: CPT | Performed by: NURSE PRACTITIONER

## 2021-09-27 PROCEDURE — 36415 COLL VENOUS BLD VENIPUNCTURE: CPT | Performed by: NURSE PRACTITIONER

## 2021-09-28 ENCOUNTER — CLINICAL SUPPORT (OUTPATIENT)
Dept: OBSTETRICS AND GYNECOLOGY | Facility: CLINIC | Age: 21
End: 2021-09-28
Payer: MEDICAID

## 2021-09-28 DIAGNOSIS — Z30.42 SURVEILLANCE FOR DEPO-PROVERA CONTRACEPTION: Primary | ICD-10-CM

## 2021-09-28 LAB — HCG INTACT+B SERPL-ACNC: <2.4 MIU/ML

## 2021-09-28 PROCEDURE — 96372 THER/PROPH/DIAG INJ SC/IM: CPT | Mod: PBBFAC

## 2021-09-28 PROCEDURE — 99999 PR PBB SHADOW E&M-EST. PATIENT-LVL I: CPT | Mod: PBBFAC,,,

## 2021-09-28 PROCEDURE — 99999 PR PBB SHADOW E&M-EST. PATIENT-LVL I: ICD-10-PCS | Mod: PBBFAC,,,

## 2021-09-28 PROCEDURE — 99211 OFF/OP EST MAY X REQ PHY/QHP: CPT | Mod: PBBFAC

## 2021-09-28 RX ORDER — MEDROXYPROGESTERONE ACETATE 150 MG/ML
150 INJECTION, SUSPENSION INTRAMUSCULAR ONCE
Status: COMPLETED | OUTPATIENT
Start: 2021-09-28 | End: 2021-09-28

## 2021-09-28 RX ADMIN — MEDROXYPROGESTERONE ACETATE 150 MG: 150 INJECTION, SUSPENSION INTRAMUSCULAR at 03:09

## 2021-12-14 ENCOUNTER — TELEPHONE (OUTPATIENT)
Dept: OBSTETRICS AND GYNECOLOGY | Facility: CLINIC | Age: 21
End: 2021-12-14
Payer: MEDICAID

## 2021-12-20 ENCOUNTER — OFFICE VISIT (OUTPATIENT)
Dept: OBSTETRICS AND GYNECOLOGY | Facility: CLINIC | Age: 21
End: 2021-12-20
Payer: MEDICAID

## 2021-12-20 VITALS
HEIGHT: 69 IN | DIASTOLIC BLOOD PRESSURE: 78 MMHG | WEIGHT: 189.13 LBS | BODY MASS INDEX: 28.01 KG/M2 | SYSTOLIC BLOOD PRESSURE: 120 MMHG

## 2021-12-20 DIAGNOSIS — Z11.3 SCREEN FOR STD (SEXUALLY TRANSMITTED DISEASE): ICD-10-CM

## 2021-12-20 DIAGNOSIS — Z01.419 ROUTINE GYNECOLOGICAL EXAMINATION: Primary | ICD-10-CM

## 2021-12-20 DIAGNOSIS — Z12.4 PAPANICOLAOU SMEAR FOR CERVICAL CANCER SCREENING: ICD-10-CM

## 2021-12-20 DIAGNOSIS — Z30.41 ENCOUNTER FOR SURVEILLANCE OF CONTRACEPTIVE PILLS: ICD-10-CM

## 2021-12-20 PROCEDURE — 88175 CYTOPATH C/V AUTO FLUID REDO: CPT | Performed by: NURSE PRACTITIONER

## 2021-12-20 PROCEDURE — 99212 OFFICE O/P EST SF 10 MIN: CPT | Mod: PBBFAC | Performed by: NURSE PRACTITIONER

## 2021-12-20 PROCEDURE — 99999 PR PBB SHADOW E&M-EST. PATIENT-LVL II: CPT | Mod: PBBFAC,,, | Performed by: NURSE PRACTITIONER

## 2021-12-20 PROCEDURE — 99395 PREV VISIT EST AGE 18-39: CPT | Mod: S$PBB,,, | Performed by: NURSE PRACTITIONER

## 2021-12-20 PROCEDURE — 87491 CHLMYD TRACH DNA AMP PROBE: CPT | Performed by: NURSE PRACTITIONER

## 2021-12-20 PROCEDURE — 99395 PR PREVENTIVE VISIT,EST,18-39: ICD-10-PCS | Mod: S$PBB,,, | Performed by: NURSE PRACTITIONER

## 2021-12-20 PROCEDURE — 87591 N.GONORRHOEAE DNA AMP PROB: CPT | Performed by: NURSE PRACTITIONER

## 2021-12-20 PROCEDURE — 99999 PR PBB SHADOW E&M-EST. PATIENT-LVL II: ICD-10-PCS | Mod: PBBFAC,,, | Performed by: NURSE PRACTITIONER

## 2021-12-20 RX ORDER — MEDROXYPROGESTERONE ACETATE 150 MG/ML
150 INJECTION, SUSPENSION INTRAMUSCULAR
Status: ACTIVE | OUTPATIENT
Start: 2021-12-20 | End: 2023-03-15

## 2021-12-20 RX ADMIN — MEDROXYPROGESTERONE ACETATE 150 MG: 150 INJECTION, SUSPENSION INTRAMUSCULAR at 04:12

## 2021-12-28 LAB
FINAL PATHOLOGIC DIAGNOSIS: NORMAL
Lab: NORMAL

## 2021-12-29 LAB
C TRACH DNA SPEC QL NAA+PROBE: NOT DETECTED
N GONORRHOEA DNA SPEC QL NAA+PROBE: NOT DETECTED

## 2022-03-07 ENCOUNTER — CLINICAL SUPPORT (OUTPATIENT)
Dept: OBSTETRICS AND GYNECOLOGY | Facility: CLINIC | Age: 22
End: 2022-03-07
Payer: MEDICAID

## 2022-03-07 DIAGNOSIS — Z30.42 DEPO-PROVERA CONTRACEPTIVE STATUS: Primary | ICD-10-CM

## 2022-03-07 PROCEDURE — 96372 THER/PROPH/DIAG INJ SC/IM: CPT | Mod: PBBFAC

## 2022-03-07 RX ADMIN — MEDROXYPROGESTERONE ACETATE 150 MG: 150 INJECTION, SUSPENSION INTRAMUSCULAR at 03:03

## 2022-03-07 NOTE — PROGRESS NOTES
Two pt identifiers identified   Patient notified to wait 15 minutes after recieiving injection, patient verbalized understanding.   150 mg depo provera  administered IM to patients left  ventrogluteal.  Patient tolerated well.  Next injection scheduled.

## 2022-10-23 ENCOUNTER — HOSPITAL ENCOUNTER (EMERGENCY)
Facility: HOSPITAL | Age: 22
Discharge: HOME | End: 2022-10-23
Attending: EMERGENCY MEDICINE
Payer: MEDICAID

## 2022-10-23 VITALS
RESPIRATION RATE: 20 BRPM | OXYGEN SATURATION: 98 % | SYSTOLIC BLOOD PRESSURE: 135 MMHG | HEIGHT: 69 IN | HEART RATE: 100 BPM | WEIGHT: 207.88 LBS | BODY MASS INDEX: 30.79 KG/M2 | TEMPERATURE: 99 F | DIASTOLIC BLOOD PRESSURE: 88 MMHG

## 2022-10-23 DIAGNOSIS — J10.1 INFLUENZA A: Primary | ICD-10-CM

## 2022-10-23 LAB
INFLUENZA A, MOLECULAR: POSITIVE
INFLUENZA B, MOLECULAR: NEGATIVE
SARS-COV-2 RDRP RESP QL NAA+PROBE: NEGATIVE
SPECIMEN SOURCE: ABNORMAL

## 2022-10-23 PROCEDURE — 87502 INFLUENZA DNA AMP PROBE: CPT | Performed by: NURSE PRACTITIONER

## 2022-10-23 PROCEDURE — U0002 COVID-19 LAB TEST NON-CDC: HCPCS | Performed by: NURSE PRACTITIONER

## 2022-10-23 PROCEDURE — 99283 EMERGENCY DEPT VISIT LOW MDM: CPT

## 2022-10-23 RX ORDER — OSELTAMIVIR PHOSPHATE 75 MG/1
75 CAPSULE ORAL 2 TIMES DAILY
Qty: 10 CAPSULE | Refills: 0 | Status: SHIPPED | OUTPATIENT
Start: 2022-10-23 | End: 2022-10-28

## 2022-10-23 NOTE — ED PROVIDER NOTES
Encounter Date: 10/23/2022       History     Chief Complaint   Patient presents with    Cough    Nasal Congestion     Congestion, cough fever, reports daughter being flu positive may have contracted from her     Patient presents to ER for flu-like symptoms.  Patient reports cough, nasal congestion, fever, onset yesterday.  She states her daughter tested positive for influenza 4 days ago.  She has taken nothing for the symptoms.  Symptoms have been constant since onset.  She denies nausea, vomiting, weakness, fatigue, abdominal pain, chest pain, shortness of breath.    The history is provided by the patient.   Review of patient's allergies indicates:  No Known Allergies  No past medical history on file.  No past surgical history on file.  No family history on file.  Social History     Tobacco Use    Smoking status: Never    Smokeless tobacco: Never   Substance Use Topics    Alcohol use: Yes     Comment: occ     Drug use: No     Review of Systems   Constitutional:  Positive for chills and fever. Negative for fatigue.   HENT:  Positive for congestion and rhinorrhea. Negative for ear pain, sinus pressure, sinus pain and sore throat.    Eyes:  Negative for pain.   Respiratory:  Positive for cough. Negative for shortness of breath.    Cardiovascular:  Negative for chest pain and palpitations.   Gastrointestinal:  Negative for abdominal pain, constipation, diarrhea, nausea and vomiting.   Genitourinary:  Negative for dysuria.   Musculoskeletal:  Negative for back pain, myalgias and neck pain.   Skin:  Negative for rash.   Neurological:  Negative for weakness and headaches.   All other systems reviewed and are negative.    Physical Exam     Initial Vitals [10/23/22 1449]   BP Pulse Resp Temp SpO2   135/88 100 20 98.6 °F (37 °C) 98 %      MAP       --         Physical Exam    Nursing note and vitals reviewed.  Constitutional: She appears well-developed and well-nourished. She is not diaphoretic. She is cooperative.   Non-toxic appearance. She does not have a sickly appearance. She does not appear ill. No distress.   HENT:   Head: Normocephalic and atraumatic.   Right Ear: External ear normal.   Left Ear: External ear normal.   Nose: Nose normal.   Mouth/Throat: Oropharynx is clear and moist. No oropharyngeal exudate.   Eyes: Conjunctivae and EOM are normal. Pupils are equal, round, and reactive to light.   Neck: Neck supple.   Normal range of motion.  Cardiovascular:  Normal rate, regular rhythm and intact distal pulses.           Pulmonary/Chest: Breath sounds normal. No respiratory distress. She has no wheezes. She has no rhonchi. She has no rales.   Abdominal: Abdomen is soft. There is no abdominal tenderness.   Musculoskeletal:         General: Normal range of motion.      Cervical back: Normal range of motion and neck supple.     Neurological: She is alert and oriented to person, place, and time. She has normal strength. GCS score is 15. GCS eye subscore is 4. GCS verbal subscore is 5. GCS motor subscore is 6.   Skin: Skin is warm and dry. Capillary refill takes less than 2 seconds.       ED Course   Procedures  Labs Reviewed   INFLUENZA A & B BY MOLECULAR - Abnormal; Notable for the following components:       Result Value    Influenza A, Molecular Positive (*)     All other components within normal limits   SARS-COV-2 RNA AMPLIFICATION, QUAL        Results for orders placed or performed during the hospital encounter of 10/23/22   Influenza A & B by Molecular    Specimen: Nasopharyngeal Swab   Result Value Ref Range    Influenza A, Molecular Positive (A) Negative    Influenza B, Molecular Negative Negative    Flu A & B Source NP    COVID-19 Rapid Screening   Result Value Ref Range    SARS-CoV-2 RNA, Amplification, Qual Negative Negative         Imaging Results    None          Medications - No data to display                    Regarding INFLUENZA, for prevention, I advised patient to: clean hands with soap and water or an  alcohol-based hand rub frequently;  cover mouth and nose with bend of elbow when coughing or sneezing; limit face-to-face contact with others;  maintain good ventilation in the home; follow proper cleaning and disposal procedures for tissues, linens, and eating utensils; and keep surfaces clean (especially bedside tables, surfaces in the bathroom, doorknobs, phones, and childrens toys) by wiping them down with disinfectants. For treatment, recommended that patient: get annual vaccination; get plenty of rest; drink clear fluids like water, broth, sports drinks, or electrolyte beverages; place cool, damp washcloth on forehead, arms, and legs to reduce discomfort associated with a fever; use a humidifier; gargle with warm salt water; and take over-the-counter acetaminophen or ibuprofen for pain relief and fever control. I also discussed the viral origin of influenza and treatment limitations.   I discussed with patient  that evaluation in the ED does not suggest any emergent or life threatening medical conditions requiring immediate intervention beyond what was provided in the ED, and I believe patient is safe for discharge. Regardless, an unremarkable evaluation in the ED does not preclude the development or presence of a serious of life threatening condition. As such, patient was instructed to return immediately for any worsening or change in current symptoms.         Clinical Impression:   Final diagnoses:  [J10.1] Influenza A (Primary)      ED Disposition Condition    Discharge Stable          ED Prescriptions       Medication Sig Dispense Start Date End Date Auth. Provider    oseltamivir (TAMIFLU) 75 MG capsule Take 1 capsule (75 mg total) by mouth 2 (two) times daily. for 5 days 10 capsule 10/23/2022 10/28/2022 Deny Lancaster NP          Follow-up Information       Follow up With Specialties Details Why Contact Info    Follow-up with PCP in 2 days.        [facility] Dept. Emergency Medicine  As needed,  If symptoms worsen [id number]  [facility] [id number] [phone]             Deny Lancaster NP  10/23/22 2021

## 2022-10-23 NOTE — Clinical Note
"Sisi Culver (Tremeeka) was seen and treated in our emergency department on 10/23/2022.  She may return to work on 10/29/2022.       If you have any questions or concerns, please don't hesitate to call.      Iveth Feng RN    "

## 2022-10-23 NOTE — FIRST PROVIDER EVALUATION
Medical screening examination initiated.  I have conducted a focused provider triage encounter, findings are as follows:    Brief history of present illness:  Patient presents to ER for flu like symptoms.  Patient states her child tested positive for influenza 4 days ago.    There were no vitals filed for this visit.    Pertinent physical exam:  No acute distress respirations even nonlabored    Brief workup plan:  Influenza/COVID-19 testing    Preliminary workup initiated; this workup will be continued and followed by the physician or advanced practice provider that is assigned to the patient when roomed.

## 2023-03-07 ENCOUNTER — LAB VISIT (OUTPATIENT)
Dept: LAB | Facility: HOSPITAL | Age: 23
End: 2023-03-07
Attending: NURSE PRACTITIONER
Payer: MEDICAID

## 2023-03-07 ENCOUNTER — OFFICE VISIT (OUTPATIENT)
Dept: OBSTETRICS AND GYNECOLOGY | Facility: CLINIC | Age: 23
End: 2023-03-07
Payer: MEDICAID

## 2023-03-07 VITALS
WEIGHT: 218.5 LBS | BODY MASS INDEX: 31.28 KG/M2 | DIASTOLIC BLOOD PRESSURE: 54 MMHG | SYSTOLIC BLOOD PRESSURE: 110 MMHG | HEIGHT: 70 IN

## 2023-03-07 DIAGNOSIS — N89.8 VAGINAL ODOR: ICD-10-CM

## 2023-03-07 DIAGNOSIS — Z11.3 SCREEN FOR STD (SEXUALLY TRANSMITTED DISEASE): ICD-10-CM

## 2023-03-07 DIAGNOSIS — Z01.419 ROUTINE GYNECOLOGICAL EXAMINATION: Primary | ICD-10-CM

## 2023-03-07 PROCEDURE — 3074F PR MOST RECENT SYSTOLIC BLOOD PRESSURE < 130 MM HG: ICD-10-PCS | Mod: CPTII,,, | Performed by: NURSE PRACTITIONER

## 2023-03-07 PROCEDURE — 80074 ACUTE HEPATITIS PANEL: CPT | Performed by: NURSE PRACTITIONER

## 2023-03-07 PROCEDURE — 99395 PREV VISIT EST AGE 18-39: CPT | Mod: S$PBB,,, | Performed by: NURSE PRACTITIONER

## 2023-03-07 PROCEDURE — 3074F SYST BP LT 130 MM HG: CPT | Mod: CPTII,,, | Performed by: NURSE PRACTITIONER

## 2023-03-07 PROCEDURE — 3078F DIAST BP <80 MM HG: CPT | Mod: CPTII,,, | Performed by: NURSE PRACTITIONER

## 2023-03-07 PROCEDURE — 3008F PR BODY MASS INDEX (BMI) DOCUMENTED: ICD-10-PCS | Mod: CPTII,,, | Performed by: NURSE PRACTITIONER

## 2023-03-07 PROCEDURE — 87389 HIV-1 AG W/HIV-1&-2 AB AG IA: CPT | Performed by: NURSE PRACTITIONER

## 2023-03-07 PROCEDURE — 1159F PR MEDICATION LIST DOCUMENTED IN MEDICAL RECORD: ICD-10-PCS | Mod: CPTII,,, | Performed by: NURSE PRACTITIONER

## 2023-03-07 PROCEDURE — 86592 SYPHILIS TEST NON-TREP QUAL: CPT | Performed by: NURSE PRACTITIONER

## 2023-03-07 PROCEDURE — 99395 PR PREVENTIVE VISIT,EST,18-39: ICD-10-PCS | Mod: S$PBB,,, | Performed by: NURSE PRACTITIONER

## 2023-03-07 PROCEDURE — 81514 NFCT DS BV&VAGINITIS DNA ALG: CPT | Performed by: NURSE PRACTITIONER

## 2023-03-07 PROCEDURE — 3008F BODY MASS INDEX DOCD: CPT | Mod: CPTII,,, | Performed by: NURSE PRACTITIONER

## 2023-03-07 PROCEDURE — 36415 COLL VENOUS BLD VENIPUNCTURE: CPT | Performed by: NURSE PRACTITIONER

## 2023-03-07 PROCEDURE — 99999 PR PBB SHADOW E&M-EST. PATIENT-LVL II: CPT | Mod: PBBFAC,,, | Performed by: NURSE PRACTITIONER

## 2023-03-07 PROCEDURE — 3078F PR MOST RECENT DIASTOLIC BLOOD PRESSURE < 80 MM HG: ICD-10-PCS | Mod: CPTII,,, | Performed by: NURSE PRACTITIONER

## 2023-03-07 PROCEDURE — 1159F MED LIST DOCD IN RCRD: CPT | Mod: CPTII,,, | Performed by: NURSE PRACTITIONER

## 2023-03-07 PROCEDURE — 99999 PR PBB SHADOW E&M-EST. PATIENT-LVL II: ICD-10-PCS | Mod: PBBFAC,,, | Performed by: NURSE PRACTITIONER

## 2023-03-07 PROCEDURE — 1160F RVW MEDS BY RX/DR IN RCRD: CPT | Mod: CPTII,,, | Performed by: NURSE PRACTITIONER

## 2023-03-07 PROCEDURE — 99212 OFFICE O/P EST SF 10 MIN: CPT | Mod: PBBFAC | Performed by: NURSE PRACTITIONER

## 2023-03-07 PROCEDURE — 1160F PR REVIEW ALL MEDS BY PRESCRIBER/CLIN PHARMACIST DOCUMENTED: ICD-10-PCS | Mod: CPTII,,, | Performed by: NURSE PRACTITIONER

## 2023-03-07 PROCEDURE — 87591 N.GONORRHOEAE DNA AMP PROB: CPT | Performed by: NURSE PRACTITIONER

## 2023-03-07 RX ORDER — METRONIDAZOLE 500 MG/1
500 TABLET ORAL EVERY 12 HOURS
Qty: 14 TABLET | Refills: 0 | Status: SHIPPED | OUTPATIENT
Start: 2023-03-07 | End: 2023-03-14

## 2023-03-07 NOTE — PROGRESS NOTES
"  Subjective:       Patient ID: Sisi Culver is a 22 y.o. female.    Chief Complaint:  No chief complaint on file.      History of Present Illness  HPI  Vaginal discharge white yellow tinge with strong foul odor without itching times 3 weeks   Declines hormonal birth control   Health Maintenance   Topic Date Due    Hepatitis C Screening  Never done    Lipid Panel  Never done    Pap Smear  2024    TETANUS VACCINE  2029    HPV Vaccines  Completed     GYN & OB History  Patient's last menstrual period was 2023.   Date of Last Pap: 2021    OB History    Para Term  AB Living   1 1 1     1   SAB IAB Ectopic Multiple Live Births         0 1      # Outcome Date GA Lbr Danny/2nd Weight Sex Delivery Anes PTL Lv   1 Term 19 40w0d  3.28 kg (7 lb 3.7 oz) F Vag-Spont None N GWEN       Review of Systems  Review of Systems        Objective:   BP (!) 110/54 (BP Location: Left arm, Patient Position: Sitting, BP Method: Medium (Manual))   Ht 5' 10" (1.778 m)   Wt 99.1 kg (218 lb 7.6 oz)   LMP 2023   BMI 31.35 kg/m²    Physical Exam:   Constitutional: She is oriented to person, place, and time. She appears well-developed and well-nourished.        Pulmonary/Chest: Right breast exhibits no inverted nipple, no mass, no nipple discharge, no skin change, no tenderness and no swelling. Left breast exhibits no inverted nipple, no mass, no nipple discharge, no skin change, no tenderness and no swelling.        Abdominal: Soft.     Genitourinary:    Inguinal canal, vagina, right adnexa and left adnexa normal.      Pelvic exam was performed with patient supine.   The external female genitalia was normal.   Genitalia hair distrobution normal .   Labial bartholins normal.Cervix is normal. No no adexnal prolapse. Right adnexum displays no mass, no tenderness and no fullness. Left adnexum displays no mass, no tenderness and no fullness. No erythema,  no vaginal discharge, bleeding, rectocele, " cystocele or unspecified prolapse of vaginal walls in the vagina.    No foreign body in the vagina.      No signs of injury in the vagina.                 Neurological: She is alert and oriented to person, place, and time.    Skin: Skin is warm and dry.    Psychiatric: She has a normal mood and affect. Her behavior is normal. Judgment and thought content normal.      Assessment:        1. Routine gynecological examination    2. Screen for STD (sexually transmitted disease)    3. Vaginal odor             Plan:           Diagnoses and all orders for this visit:    Routine gynecological examination    Screen for STD (sexually transmitted disease)  -     HIV 1/2 Ag/Ab (4th Gen); Future  -     RPR; Future  -     Hepatitis Panel, Acute; Future  -     C. trachomatis/N. gonorrhoeae by AMP DNA [facility]; Vagina    Vaginal odor  -     Vaginosis Screen by DNA Probe  -     metroNIDAZOLE (FLAGYL) 500 MG tablet; Take 1 tablet (500 mg total) by mouth every 12 (twelve) hours. for 7 days

## 2023-03-08 LAB
HAV IGM SERPL QL IA: NORMAL
HBV CORE IGM SERPL QL IA: NORMAL
HBV SURFACE AG SERPL QL IA: NORMAL
HCV AB SERPL QL IA: NORMAL
HIV 1+2 AB+HIV1 P24 AG SERPL QL IA: NORMAL
RPR SER QL: NORMAL

## 2023-03-09 ENCOUNTER — PATIENT MESSAGE (OUTPATIENT)
Dept: OBSTETRICS AND GYNECOLOGY | Facility: CLINIC | Age: 23
End: 2023-03-09
Payer: MEDICAID

## 2023-03-09 LAB
BACTERIAL VAGINOSIS DNA: NEGATIVE
C TRACH DNA SPEC QL NAA+PROBE: NOT DETECTED
CANDIDA GLABRATA DNA: NEGATIVE
CANDIDA KRUSEI DNA: NEGATIVE
CANDIDA RRNA VAG QL PROBE: NEGATIVE
N GONORRHOEA DNA SPEC QL NAA+PROBE: NOT DETECTED
T VAGINALIS RRNA GENITAL QL PROBE: NEGATIVE

## 2023-07-11 ENCOUNTER — PATIENT MESSAGE (OUTPATIENT)
Dept: RESEARCH | Facility: HOSPITAL | Age: 23
End: 2023-07-11
Payer: MEDICAID

## 2023-12-15 ENCOUNTER — HOSPITAL ENCOUNTER (EMERGENCY)
Facility: HOSPITAL | Age: 23
Discharge: HOME | End: 2023-12-15
Attending: EMERGENCY MEDICINE

## 2023-12-15 VITALS
RESPIRATION RATE: 18 BRPM | SYSTOLIC BLOOD PRESSURE: 119 MMHG | OXYGEN SATURATION: 100 % | HEART RATE: 120 BPM | WEIGHT: 213.5 LBS | DIASTOLIC BLOOD PRESSURE: 72 MMHG | BODY MASS INDEX: 30.64 KG/M2 | TEMPERATURE: 101 F

## 2023-12-15 DIAGNOSIS — N93.9 VAGINAL BLEEDING: ICD-10-CM

## 2023-12-15 DIAGNOSIS — J02.0 STREP PHARYNGITIS: Primary | ICD-10-CM

## 2023-12-15 LAB
ALBUMIN SERPL BCP-MCNC: 3.9 G/DL (ref 3.5–5.2)
ALP SERPL-CCNC: 63 U/L (ref 55–135)
ALT SERPL W/O P-5'-P-CCNC: 13 U/L (ref 10–44)
ANION GAP SERPL CALC-SCNC: 13 MMOL/L (ref 8–16)
AST SERPL-CCNC: 14 U/L (ref 10–40)
B-HCG UR QL: POSITIVE
BACTERIA #/AREA URNS HPF: ABNORMAL /HPF
BASOPHILS # BLD AUTO: 0.02 K/UL (ref 0–0.2)
BASOPHILS NFR BLD: 0.2 % (ref 0–1.9)
BILIRUB SERPL-MCNC: 0.6 MG/DL (ref 0.1–1)
BILIRUB UR QL STRIP: NEGATIVE
BUN SERPL-MCNC: 4 MG/DL (ref 6–20)
CALCIUM SERPL-MCNC: 8.9 MG/DL (ref 8.7–10.5)
CHLORIDE SERPL-SCNC: 104 MMOL/L (ref 95–110)
CLARITY UR: ABNORMAL
CO2 SERPL-SCNC: 20 MMOL/L (ref 23–29)
COLOR UR: YELLOW
CREAT SERPL-MCNC: 0.8 MG/DL (ref 0.5–1.4)
DIFFERENTIAL METHOD: ABNORMAL
EOSINOPHIL # BLD AUTO: 0.1 K/UL (ref 0–0.5)
EOSINOPHIL NFR BLD: 0.5 % (ref 0–8)
ERYTHROCYTE [DISTWIDTH] IN BLOOD BY AUTOMATED COUNT: 11.9 % (ref 11.5–14.5)
EST. GFR  (NO RACE VARIABLE): >60 ML/MIN/1.73 M^2
GLUCOSE SERPL-MCNC: 87 MG/DL (ref 70–110)
GLUCOSE UR QL STRIP: NEGATIVE
GROUP A STREP, MOLECULAR: POSITIVE
HCG INTACT+B SERPL-ACNC: 481 MIU/ML
HCT VFR BLD AUTO: 33.3 % (ref 37–48.5)
HGB BLD-MCNC: 11.8 G/DL (ref 12–16)
HGB UR QL STRIP: ABNORMAL
IMM GRANULOCYTES # BLD AUTO: 0.04 K/UL (ref 0–0.04)
IMM GRANULOCYTES NFR BLD AUTO: 0.4 % (ref 0–0.5)
INFLUENZA A, MOLECULAR: NEGATIVE
INFLUENZA B, MOLECULAR: NEGATIVE
KETONES UR QL STRIP: ABNORMAL
LEUKOCYTE ESTERASE UR QL STRIP: ABNORMAL
LYMPHOCYTES # BLD AUTO: 1.3 K/UL (ref 1–4.8)
LYMPHOCYTES NFR BLD: 13 % (ref 18–48)
MCH RBC QN AUTO: 30.4 PG (ref 27–31)
MCHC RBC AUTO-ENTMCNC: 35.4 G/DL (ref 32–36)
MCV RBC AUTO: 86 FL (ref 82–98)
MICROSCOPIC COMMENT: ABNORMAL
MONOCYTES # BLD AUTO: 0.9 K/UL (ref 0.3–1)
MONOCYTES NFR BLD: 9.5 % (ref 4–15)
NEUTROPHILS # BLD AUTO: 7.5 K/UL (ref 1.8–7.7)
NEUTROPHILS NFR BLD: 76.4 % (ref 38–73)
NITRITE UR QL STRIP: NEGATIVE
NRBC BLD-RTO: 0 /100 WBC
PH UR STRIP: 6 [PH] (ref 5–8)
PLATELET # BLD AUTO: 203 K/UL (ref 150–450)
PMV BLD AUTO: 12 FL (ref 9.2–12.9)
POTASSIUM SERPL-SCNC: 3.6 MMOL/L (ref 3.5–5.1)
PROT SERPL-MCNC: 7.4 G/DL (ref 6–8.4)
PROT UR QL STRIP: ABNORMAL
RBC # BLD AUTO: 3.88 M/UL (ref 4–5.4)
RBC #/AREA URNS HPF: 7 /HPF (ref 0–4)
SARS-COV-2 RDRP RESP QL NAA+PROBE: NEGATIVE
SODIUM SERPL-SCNC: 137 MMOL/L (ref 136–145)
SP GR UR STRIP: 1.01 (ref 1–1.03)
SPECIMEN SOURCE: NORMAL
SQUAMOUS #/AREA URNS HPF: 27 /HPF
URN SPEC COLLECT METH UR: ABNORMAL
UROBILINOGEN UR STRIP-ACNC: ABNORMAL EU/DL
WBC # BLD AUTO: 9.84 K/UL (ref 3.9–12.7)
WBC #/AREA URNS HPF: 48 /HPF (ref 0–5)

## 2023-12-15 PROCEDURE — 87502 INFLUENZA DNA AMP PROBE: CPT | Performed by: NURSE PRACTITIONER

## 2023-12-15 PROCEDURE — U0002 COVID-19 LAB TEST NON-CDC: HCPCS | Performed by: NURSE PRACTITIONER

## 2023-12-15 PROCEDURE — 81000 URINALYSIS NONAUTO W/SCOPE: CPT | Performed by: NURSE PRACTITIONER

## 2023-12-15 PROCEDURE — 99284 EMERGENCY DEPT VISIT MOD MDM: CPT | Mod: 25

## 2023-12-15 PROCEDURE — 85025 COMPLETE CBC W/AUTO DIFF WBC: CPT | Performed by: NURSE PRACTITIONER

## 2023-12-15 PROCEDURE — 84702 CHORIONIC GONADOTROPIN TEST: CPT | Performed by: NURSE PRACTITIONER

## 2023-12-15 PROCEDURE — 80053 COMPREHEN METABOLIC PANEL: CPT | Performed by: NURSE PRACTITIONER

## 2023-12-15 PROCEDURE — 87651 STREP A DNA AMP PROBE: CPT | Performed by: NURSE PRACTITIONER

## 2023-12-15 PROCEDURE — 87086 URINE CULTURE/COLONY COUNT: CPT | Performed by: NURSE PRACTITIONER

## 2023-12-15 PROCEDURE — 25000003 PHARM REV CODE 250: Performed by: NURSE PRACTITIONER

## 2023-12-15 PROCEDURE — 81025 URINE PREGNANCY TEST: CPT | Performed by: NURSE PRACTITIONER

## 2023-12-15 RX ORDER — ACETAMINOPHEN 500 MG
1000 TABLET ORAL
Status: COMPLETED | OUTPATIENT
Start: 2023-12-15 | End: 2023-12-15

## 2023-12-15 RX ORDER — AMOXICILLIN 500 MG/1
500 CAPSULE ORAL 3 TIMES DAILY
Qty: 21 CAPSULE | Refills: 0 | Status: SHIPPED | OUTPATIENT
Start: 2023-12-15 | End: 2023-12-22

## 2023-12-15 RX ADMIN — ACETAMINOPHEN 1000 MG: 500 TABLET ORAL at 04:12

## 2023-12-15 NOTE — Clinical Note
"Sisi Addisonjanie Culver was seen and treated in our emergency department on 12/15/2023.  She may return to work on 12/17/2023.       If you have any questions or concerns, please don't hesitate to call.      Manuel Walter NP"

## 2023-12-15 NOTE — FIRST PROVIDER EVALUATION
Emergency Department TeleTriage Encounter Note      CHIEF COMPLAINT    Chief Complaint   Patient presents with    Headache     Pt c/o headache x1.5 weeks with left sided facial pain that hurts with slight touch x4 days. Pt states she also has a rash on her chest.       VITAL SIGNS   Initial Vitals [12/15/23 1538]   BP Pulse Resp Temp SpO2   119/72 (!) 120 18 (!) 100.7 °F (38.2 °C) 100 %      MAP       --            ALLERGIES    Review of patient's allergies indicates:  No Known Allergies    PROVIDER TRIAGE NOTE  Verbal consent for the teletriage evaluation was given by the patient at the start of the evaluation.  All efforts will be made to maintain patient's privacy during the evaluation.      This is a teletriage evaluation of a 23 y.o. female presenting to the ED with c/o HA and sore throat that started 2 days ago. Limited physical exam via telehealth: The patient is awake, alert, answering questions appropriately and is not in respiratory distress.  As the Teletriage provider, I performed an initial assessment and ordered appropriate labs and imaging studies, if any, to facilitate the patient's care once placed in the ED. Once a room is available, care and a full evaluation will be completed by an alternate ED provider.  Any additional orders and the final disposition will be determined by that provider.  All imaging and labs will not be followed-up by the Teletriage Team, including myself.          ORDERS  Labs Reviewed   GROUP A STREP, MOLECULAR   INFLUENZA A & B BY MOLECULAR   SARS-COV-2 RNA AMPLIFICATION, QUAL   PREGNANCY TEST, URINE RAPID       ED Orders (720h ago, onward)      Start Ordered     Status Ordering Provider    12/15/23 1600 12/15/23 1554  acetaminophen tablet 1,000 mg  ED 1 Time         Ordered RIDGE JIMENEZ    12/15/23 1555 12/15/23 1554  Group A Strep, Molecular  STAT         Ordered RIDGE JIMENEZ    12/15/23 1555 12/15/23 1554  COVID-19 Rapid Screening  STAT         Ordered RIDGE JIMENEZ     12/15/23 1555 12/15/23 1554  Influenza A & B by Molecular  Once         Ordered RIDGE JIMENEZ    12/15/23 1555 12/15/23 1554  Pregnancy, urine rapid  Once         Ordered RIDGE JIMENEZ              Virtual Visit Note: The provider triage portion of this emergency department evaluation and documentation was performed via [employer], a HIPAA-compliant telemedicine application, in concert with a tele-presenter in the room. A face to face patient evaluation with one of my colleagues will occur once the patient is placed in an emergency department room.      DISCLAIMER: This note was prepared with [employer] voice recognition transcription software. Garbled syntax, mangled pronouns, and other bizarre constructions may be attributed to that software system.

## 2023-12-16 NOTE — ED NOTES
Bed: [facility] 06  Expected date:   Expected time:   Means of arrival:   Comments:

## 2023-12-16 NOTE — ED PROVIDER NOTES
Encounter Date: 12/15/2023       History     Chief Complaint   Patient presents with    Headache     Pt c/o headache x1.5 weeks with left sided facial pain that hurts with slight touch x4 days. Pt states she also has a rash on her chest.     Patient presents to the ED complaining of sore throat body aches chills.        Review of patient's allergies indicates:  No Known Allergies  No past medical history on file.  No past surgical history on file.  No family history on file.  Social History     Tobacco Use    Smoking status: Never    Smokeless tobacco: Never   Substance Use Topics    Alcohol use: Yes     Comment: occ     Drug use: No     Review of Systems   Constitutional:  Negative for fever.   HENT:  Negative for sore throat.    Respiratory:  Negative for shortness of breath.    Cardiovascular:  Negative for chest pain.   Gastrointestinal:  Negative for nausea.   Genitourinary:  Negative for dysuria.   Musculoskeletal:  Negative for back pain.   Skin:  Negative for rash.   Neurological:  Negative for weakness.   Hematological:  Does not bruise/bleed easily.       Physical Exam     Initial Vitals [12/15/23 1538]   BP Pulse Resp Temp SpO2   119/72 (!) 120 18 (!) 100.7 °F (38.2 °C) 100 %      MAP       --         Physical Exam    Nursing note and vitals reviewed.  Constitutional: She appears well-developed and well-nourished. She is not diaphoretic. She is active.  Non-toxic appearance. No distress.   HENT:   Head: Normocephalic and atraumatic.   Eyes: Conjunctivae are normal. Right eye exhibits no discharge. Left eye exhibits no discharge. No scleral icterus.   Neck:   Normal range of motion.  Cardiovascular:  Normal rate, regular rhythm and intact distal pulses.           No murmur heard.  Pulmonary/Chest: Breath sounds normal. No respiratory distress. She has no wheezes.   Abdominal: She exhibits no distension.   Musculoskeletal:         General: No tenderness. Normal range of motion.      Cervical back: Normal  range of motion.     Neurological: She is alert and oriented to person, place, and time. No cranial nerve deficit. GCS score is 15. GCS eye subscore is 4. GCS verbal subscore is 5. GCS motor subscore is 6.   Skin: Skin is warm and dry. Capillary refill takes less than 2 seconds. No rash noted.   Psychiatric: She has a normal mood and affect. Her behavior is normal. Judgment and thought content normal.         ED Course   Procedures  Labs Reviewed   GROUP A STREP, MOLECULAR - Abnormal; Notable for the following components:       Result Value    Group A Strep, Molecular Positive (*)     All other components within normal limits   PREGNANCY TEST, URINE RAPID - Abnormal; Notable for the following components:    Preg Test, Ur Positive (*)     All other components within normal limits    Narrative:     Specimen Source->Urine   CBC W/ AUTO DIFFERENTIAL - Abnormal; Notable for the following components:    RBC 3.88 (*)     Hemoglobin 11.8 (*)     Hematocrit 33.3 (*)     Gran % 76.4 (*)     Lymph % 13.0 (*)     All other components within normal limits    Narrative:     Release to patient->Immediate   COMPREHENSIVE METABOLIC PANEL - Abnormal; Notable for the following components:    CO2 20 (*)     BUN 4 (*)     All other components within normal limits    Narrative:     Release to patient->Immediate   URINALYSIS, REFLEX TO URINE CULTURE - Abnormal; Notable for the following components:    Appearance, UA Hazy (*)     Protein, UA Trace (*)     Ketones, UA 3+ (*)     Occult Blood UA 3+ (*)     Urobilinogen, UA 2.0-3.0 (*)     Leukocytes, UA 3+ (*)     All other components within normal limits    Narrative:     Specimen Source->Urine   URINALYSIS MICROSCOPIC - Abnormal; Notable for the following components:    RBC, UA 7 (*)     WBC, UA 48 (*)     All other components within normal limits    Narrative:     Specimen Source->Urine   INFLUENZA A & B BY MOLECULAR   CULTURE, URINE   SARS-COV-2 RNA AMPLIFICATION, QUAL   HCG,  QUANTITATIVE    Narrative:     Release to patient->Immediate   URINALYSIS, REFLEX TO URINE CULTURE          Imaging Results               US OB <14 Wks TransAbd & TransVag, Single Gestation (XPD) (Final result)  Result time 12/15/23 19:57:45   Procedure changed from US OB <14 Wks, TransAbd, Single Gestation     Final result by Vance Garcia MD (12/15/23 19:57:45)                   Impression:      Prominent vascularity and heterogeneous material within the endometrium concerning for retained products of conception.    Diffuse hypervascularity throughout the uterus, nonspecific possibly related to infection.  Correlation is advised.    No gestation identified.  Serial beta HCG as warranted.    This report was flagged in Epic as abnormal.      Electronically signed by: Vance Garcia  Date:    12/15/2023  Time:    19:57               Narrative:    EXAMINATION:  US OB <14 WEEKS, TRANSABDOM & TRANSVAG, SINGLE GESTATION (XPD)    CLINICAL HISTORY:  vaginal bleeding; Abnormal uterine and vaginal bleeding, unspecified    TECHNIQUE:  Transabdominal sonography of the pelvis was performed, followed by transvaginal sonography to better evaluate the uterus and ovaries.    COMPARISON:  None.    FINDINGS:  Within the endometrium there are is prominent vascularity and heterogeneous material concerning for retained products of conception.  Diffuse hypervascularity is noted throughout the uterus.  A component of infection is not excluded.  Correlation is advised.    Right ovary: Normal.    Left ovary: Normal.    Miscellaneous: No Free Fluid.                                       Medications   acetaminophen tablet 1,000 mg (1,000 mg Oral Given 12/15/23 1602)     Medical Decision Making  UPT was incidentally positive.  After discussing with the patient recent signs and symptoms she reports vaginal spotting and abdominal cramping today.  Patient believes she has not been sexually active since 3 months ago.  There was an extended  wait in the ER today there was not a exam room for a pelvic in a timely fashion patient did not want to stay for an exam.  Discussed findings with OB on call Dr. Fraire, needs repeat HCG in two days.     Amount and/or Complexity of Data Reviewed  Labs: ordered.  Radiology: ordered.    Risk  Prescription drug management.                                      Clinical Impression:  Final diagnoses:  [N93.9] Vaginal bleeding  [J02.0] Strep pharyngitis (Primary)          ED Disposition Condition    Discharge Stable          ED Prescriptions       Medication Sig Dispense Start Date End Date Auth. Provider    amoxicillin (AMOXIL) 500 MG capsule Take 1 capsule (500 mg total) by mouth 3 (three) times daily. for 7 days 21 capsule 12/15/2023 12/22/2023 Manuel Walter NP          Follow-up Information       Follow up With Specialties Details Why Contact Info    [facility] Obstetrics and Gynecology Schedule an appointment as soon as possible for a visit in 2 days  [address] [facility] [address] [phone]             Manuel Walter NP  12/15/23 2637

## 2023-12-17 LAB
BACTERIA UR CULT: NORMAL
BACTERIA UR CULT: NORMAL

## 2024-10-03 ENCOUNTER — HOSPITAL ENCOUNTER (EMERGENCY)
Facility: HOSPITAL | Age: 24
Discharge: HOME | End: 2024-10-03
Attending: EMERGENCY MEDICINE
Payer: COMMERCIAL

## 2024-10-03 VITALS
RESPIRATION RATE: 18 BRPM | SYSTOLIC BLOOD PRESSURE: 120 MMHG | TEMPERATURE: 98 F | HEIGHT: 70 IN | HEART RATE: 75 BPM | BODY MASS INDEX: 32.19 KG/M2 | OXYGEN SATURATION: 96 % | DIASTOLIC BLOOD PRESSURE: 79 MMHG | WEIGHT: 224.88 LBS

## 2024-10-03 DIAGNOSIS — M54.50 LOW BACK PAIN WITHOUT SCIATICA, UNSPECIFIED BACK PAIN LATERALITY, UNSPECIFIED CHRONICITY: Primary | ICD-10-CM
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
## 2024-10-03 PROCEDURE — 99283 EMERGENCY DEPT VISIT LOW MDM: CPT

## 2024-10-03 RX ORDER — METHOCARBAMOL 500 MG/1
500 TABLET, FILM COATED ORAL 3 TIMES DAILY PRN
Qty: 15 TABLET | Refills: 0 | Status: SHIPPED | OUTPATIENT
Start: 2024-10-03

## 2024-10-03 NOTE — ED NOTES
Discharged by provider without nursing intervention. Provider gave discharge instructions and handed pt discharge paperwork

## 2024-10-03 NOTE — Clinical Note
"Sisi Addisonminor" Abiola was seen and treated in our emergency department on 10/3/2024.  She may return to work on 10/07/2024.       If you have any questions or concerns, please don't hesitate to call.      Nicolás Ames, NP"
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
